# Patient Record
Sex: MALE | Race: BLACK OR AFRICAN AMERICAN | Employment: FULL TIME | URBAN - METROPOLITAN AREA
[De-identification: names, ages, dates, MRNs, and addresses within clinical notes are randomized per-mention and may not be internally consistent; named-entity substitution may affect disease eponyms.]

---

## 2017-10-31 ENCOUNTER — HOSPITAL ENCOUNTER (OUTPATIENT)
Dept: ULTRASOUND IMAGING | Age: 57
Discharge: HOME | End: 2017-10-31
Attending: INTERNAL MEDICINE
Payer: COMMERCIAL

## 2017-10-31 DIAGNOSIS — Z94.0 KIDNEY TRANSPLANTED: ICD-10-CM

## 2017-10-31 PROCEDURE — 76776 US EXAM K TRANSPL W/DOPPLER: CPT

## 2018-01-16 ENCOUNTER — APPOINTMENT (OUTPATIENT)
Dept: CT IMAGING | Age: 58
DRG: 683 | End: 2018-01-16
Attending: EMERGENCY MEDICINE
Payer: COMMERCIAL

## 2018-01-16 ENCOUNTER — APPOINTMENT (OUTPATIENT)
Dept: ULTRASOUND IMAGING | Age: 58
DRG: 683 | End: 2018-01-16
Attending: INTERNAL MEDICINE
Payer: COMMERCIAL

## 2018-01-16 ENCOUNTER — HOSPITAL ENCOUNTER (INPATIENT)
Age: 58
LOS: 2 days | Discharge: HOME | DRG: 683 | End: 2018-01-20
Attending: EMERGENCY MEDICINE | Admitting: INTERNAL MEDICINE
Payer: COMMERCIAL

## 2018-01-16 DIAGNOSIS — N17.9 ACUTE KIDNEY INJURY (HCC): Primary | ICD-10-CM

## 2018-01-16 DIAGNOSIS — Z94.0 RENAL TRANSPLANT RECIPIENT: ICD-10-CM

## 2018-01-16 PROBLEM — N18.6 ESRD (END STAGE RENAL DISEASE) (HCC): Status: ACTIVE | Noted: 2018-01-16

## 2018-01-16 PROBLEM — N18.9 ACUTE ON CHRONIC KIDNEY FAILURE (HCC): Status: ACTIVE | Noted: 2018-01-16

## 2018-01-16 PROBLEM — I10 HTN (HYPERTENSION): Status: ACTIVE | Noted: 2018-01-16

## 2018-01-16 LAB
ALBUMIN SERPL-MCNC: 4.7 G/DL (ref 3.5–5)
ALBUMIN/GLOB SERPL: 1 {RATIO} (ref 1.2–3.5)
ALP SERPL-CCNC: 52 U/L (ref 50–136)
ALT SERPL-CCNC: 28 U/L (ref 12–65)
ANION GAP SERPL CALC-SCNC: 13 MMOL/L (ref 7–16)
AST SERPL-CCNC: 37 U/L (ref 15–37)
BASOPHILS # BLD: 0 K/UL (ref 0–0.2)
BASOPHILS NFR BLD: 0 % (ref 0–2)
BILIRUB SERPL-MCNC: 0.7 MG/DL (ref 0.2–1.1)
BUN SERPL-MCNC: 34 MG/DL (ref 6–23)
CALCIUM SERPL-MCNC: 9.9 MG/DL (ref 8.3–10.4)
CHLORIDE SERPL-SCNC: 101 MMOL/L (ref 98–107)
CO2 SERPL-SCNC: 25 MMOL/L (ref 21–32)
CREAT SERPL-MCNC: 3.55 MG/DL (ref 0.8–1.5)
DIFFERENTIAL METHOD BLD: ABNORMAL
EOSINOPHIL # BLD: 0.1 K/UL (ref 0–0.8)
EOSINOPHIL NFR BLD: 1 % (ref 0.5–7.8)
ERYTHROCYTE [DISTWIDTH] IN BLOOD BY AUTOMATED COUNT: 13.6 % (ref 11.9–14.6)
GLOBULIN SER CALC-MCNC: 4.9 G/DL (ref 2.3–3.5)
GLUCOSE SERPL-MCNC: 150 MG/DL (ref 65–100)
HCT VFR BLD AUTO: 49.2 % (ref 41.1–50.3)
HGB BLD-MCNC: 16.9 G/DL (ref 13.6–17.2)
IMM GRANULOCYTES # BLD: 0 K/UL (ref 0–0.5)
IMM GRANULOCYTES NFR BLD AUTO: 0 % (ref 0–5)
LYMPHOCYTES # BLD: 0.6 K/UL (ref 0.5–4.6)
LYMPHOCYTES NFR BLD: 8 % (ref 13–44)
MCH RBC QN AUTO: 31.6 PG (ref 26.1–32.9)
MCHC RBC AUTO-ENTMCNC: 34.3 G/DL (ref 31.4–35)
MCV RBC AUTO: 92.1 FL (ref 79.6–97.8)
MONOCYTES # BLD: 0.6 K/UL (ref 0.1–1.3)
MONOCYTES NFR BLD: 8 % (ref 4–12)
NEUTS SEG # BLD: 6.4 K/UL (ref 1.7–8.2)
NEUTS SEG NFR BLD: 83 % (ref 43–78)
PLATELET # BLD AUTO: 175 K/UL (ref 150–450)
PMV BLD AUTO: 12.2 FL (ref 10.8–14.1)
POTASSIUM SERPL-SCNC: 4.6 MMOL/L (ref 3.5–5.1)
PROT SERPL-MCNC: 9.6 G/DL (ref 6.3–8.2)
RBC # BLD AUTO: 5.34 M/UL (ref 4.23–5.67)
SODIUM SERPL-SCNC: 139 MMOL/L (ref 136–145)
WBC # BLD AUTO: 7.8 K/UL (ref 4.3–11.1)

## 2018-01-16 PROCEDURE — C9113 INJ PANTOPRAZOLE SODIUM, VIA: HCPCS | Performed by: INTERNAL MEDICINE

## 2018-01-16 PROCEDURE — 87493 C DIFF AMPLIFIED PROBE: CPT | Performed by: INTERNAL MEDICINE

## 2018-01-16 PROCEDURE — 74011250636 HC RX REV CODE- 250/636: Performed by: INTERNAL MEDICINE

## 2018-01-16 PROCEDURE — 99284 EMERGENCY DEPT VISIT MOD MDM: CPT | Performed by: EMERGENCY MEDICINE

## 2018-01-16 PROCEDURE — 80053 COMPREHEN METABOLIC PANEL: CPT | Performed by: EMERGENCY MEDICINE

## 2018-01-16 PROCEDURE — 74011000250 HC RX REV CODE- 250: Performed by: INTERNAL MEDICINE

## 2018-01-16 PROCEDURE — 74011636320 HC RX REV CODE- 636/320: Performed by: EMERGENCY MEDICINE

## 2018-01-16 PROCEDURE — 96374 THER/PROPH/DIAG INJ IV PUSH: CPT | Performed by: EMERGENCY MEDICINE

## 2018-01-16 PROCEDURE — 87804 INFLUENZA ASSAY W/OPTIC: CPT | Performed by: INTERNAL MEDICINE

## 2018-01-16 PROCEDURE — 99218 HC RM OBSERVATION: CPT

## 2018-01-16 PROCEDURE — 0T9B70Z DRAINAGE OF BLADDER WITH DRAINAGE DEVICE, VIA NATURAL OR ARTIFICIAL OPENING: ICD-10-PCS | Performed by: INTERNAL MEDICINE

## 2018-01-16 PROCEDURE — 81003 URINALYSIS AUTO W/O SCOPE: CPT | Performed by: EMERGENCY MEDICINE

## 2018-01-16 PROCEDURE — 74011250636 HC RX REV CODE- 250/636: Performed by: EMERGENCY MEDICINE

## 2018-01-16 PROCEDURE — 85025 COMPLETE CBC W/AUTO DIFF WBC: CPT | Performed by: EMERGENCY MEDICINE

## 2018-01-16 PROCEDURE — 76776 US EXAM K TRANSPL W/DOPPLER: CPT

## 2018-01-16 PROCEDURE — 96361 HYDRATE IV INFUSION ADD-ON: CPT | Performed by: EMERGENCY MEDICINE

## 2018-01-16 PROCEDURE — 74176 CT ABD & PELVIS W/O CONTRAST: CPT

## 2018-01-16 RX ORDER — TAMSULOSIN HYDROCHLORIDE 0.4 MG/1
0.4 CAPSULE ORAL DAILY
Status: DISCONTINUED | OUTPATIENT
Start: 2018-01-17 | End: 2018-01-20 | Stop reason: HOSPADM

## 2018-01-16 RX ORDER — PREDNISONE 5 MG/1
5 TABLET ORAL
Status: DISCONTINUED | OUTPATIENT
Start: 2018-01-17 | End: 2018-01-20 | Stop reason: HOSPADM

## 2018-01-16 RX ORDER — ONDANSETRON 2 MG/ML
4 INJECTION INTRAMUSCULAR; INTRAVENOUS
Status: DISCONTINUED | OUTPATIENT
Start: 2018-01-16 | End: 2018-01-20 | Stop reason: HOSPADM

## 2018-01-16 RX ORDER — ONDANSETRON 2 MG/ML
4 INJECTION INTRAMUSCULAR; INTRAVENOUS
Status: COMPLETED | OUTPATIENT
Start: 2018-01-16 | End: 2018-01-16

## 2018-01-16 RX ORDER — SODIUM CHLORIDE 0.9 % (FLUSH) 0.9 %
5-10 SYRINGE (ML) INJECTION EVERY 8 HOURS
Status: DISCONTINUED | OUTPATIENT
Start: 2018-01-16 | End: 2018-01-20 | Stop reason: HOSPADM

## 2018-01-16 RX ORDER — MYCOPHENOLATE MOFETIL 500 MG/1
500 TABLET ORAL 2 TIMES DAILY
Status: DISCONTINUED | OUTPATIENT
Start: 2018-01-16 | End: 2018-01-17

## 2018-01-16 RX ORDER — TACROLIMUS 1 MG/1
6 CAPSULE ORAL EVERY 12 HOURS
Status: DISCONTINUED | OUTPATIENT
Start: 2018-01-16 | End: 2018-01-20 | Stop reason: HOSPADM

## 2018-01-16 RX ORDER — SIROLIMUS 2 MG/1
8 TABLET, FILM COATED ORAL DAILY
Status: DISCONTINUED | OUTPATIENT
Start: 2018-01-17 | End: 2018-01-16

## 2018-01-16 RX ORDER — PANTOPRAZOLE SODIUM 40 MG/1
40 TABLET, DELAYED RELEASE ORAL
Status: DISCONTINUED | OUTPATIENT
Start: 2018-01-17 | End: 2018-01-20 | Stop reason: HOSPADM

## 2018-01-16 RX ORDER — HEPARIN SODIUM 5000 [USP'U]/ML
5000 INJECTION, SOLUTION INTRAVENOUS; SUBCUTANEOUS EVERY 8 HOURS
Status: DISCONTINUED | OUTPATIENT
Start: 2018-01-16 | End: 2018-01-20 | Stop reason: HOSPADM

## 2018-01-16 RX ORDER — SODIUM CHLORIDE 9 MG/ML
1000 INJECTION, SOLUTION INTRAVENOUS ONCE
Status: COMPLETED | OUTPATIENT
Start: 2018-01-16 | End: 2018-01-16

## 2018-01-16 RX ORDER — SODIUM CHLORIDE 0.9 % (FLUSH) 0.9 %
5-10 SYRINGE (ML) INJECTION AS NEEDED
Status: DISCONTINUED | OUTPATIENT
Start: 2018-01-16 | End: 2018-01-20 | Stop reason: HOSPADM

## 2018-01-16 RX ORDER — SODIUM CHLORIDE 9 MG/ML
100 INJECTION, SOLUTION INTRAVENOUS CONTINUOUS
Status: DISCONTINUED | OUTPATIENT
Start: 2018-01-16 | End: 2018-01-19

## 2018-01-16 RX ORDER — ACETAMINOPHEN 325 MG/1
650 TABLET ORAL
Status: DISCONTINUED | OUTPATIENT
Start: 2018-01-16 | End: 2018-01-20 | Stop reason: HOSPADM

## 2018-01-16 RX ADMIN — ONDANSETRON 4 MG: 2 INJECTION INTRAMUSCULAR; INTRAVENOUS at 10:01

## 2018-01-16 RX ADMIN — SODIUM CHLORIDE 40 MG: 9 INJECTION INTRAMUSCULAR; INTRAVENOUS; SUBCUTANEOUS at 21:17

## 2018-01-16 RX ADMIN — HEPARIN SODIUM 5000 UNITS: 5000 INJECTION, SOLUTION INTRAVENOUS; SUBCUTANEOUS at 21:17

## 2018-01-16 RX ADMIN — SODIUM CHLORIDE 100 ML/HR: 900 INJECTION, SOLUTION INTRAVENOUS at 20:00

## 2018-01-16 RX ADMIN — SODIUM CHLORIDE 1000 ML: 900 INJECTION, SOLUTION INTRAVENOUS at 10:01

## 2018-01-16 RX ADMIN — DIATRIZOATE MEGLUMINE AND DIATRIZOATE SODIUM 15 ML: 600; 100 SOLUTION ORAL; RECTAL at 10:02

## 2018-01-16 RX ADMIN — MYCOPHENOLATE MOFETIL 500 MG: 500 TABLET ORAL at 21:17

## 2018-01-16 RX ADMIN — TACROLIMUS 6 MG: 1 CAPSULE ORAL at 21:00

## 2018-01-16 RX ADMIN — ONDANSETRON 4 MG: 2 INJECTION INTRAMUSCULAR; INTRAVENOUS at 21:17

## 2018-01-16 RX ADMIN — Medication 10 ML: at 21:26

## 2018-01-16 NOTE — H&P
Hospitalist H&P/Consult Note     Admit Date:  2018  9:05 AM   Name:  Lela Root   Age:  65 y.o.  :  1960   MRN:  [id number]   PCP:  Laura Nieto MD  Treatment Team: Attending Provider: Ivy Harp MD; Primary Nurse: Marie Mckeon RN    HPI:     65years old M with PMH of ESRD s/p kidney transplant, HTN presented to the ED complaining of nausea, vomiting and diarrhea since last night. Patient stated last night he was having \" bowels cramps\" associated with nausea and vomiting several times food content. Patient described the diarrhea as watery several episodes. Patient stated he is working at a [employer] where there are several people with similar symptoms. Patient also reported having associated reflux symptoms. Patient denies sick contact at home or eating outside/ new food intake. Patient also denies SOB, chest pain, cough or urinary symptoms. In the ED work up revealed acute on chronic kidney injury. 10 systems reviewed and negative except as noted in HPI. Past Medical History:   Diagnosis Date    Chronic Kidney Disease     renal transplant    GERD (Gastroesophageal Reflux Disease)     Hypertension     PUD (Peptic Ulcer Disease)     Thromboembolus ([address]       Past Surgical History:   Procedure Laterality Date    HX TRANSPLANT      kidney      Prior to Admission Medications   Prescriptions Last Dose Informant Patient Reported? Taking? ALENDRONATE SODIUM (FOSAMAX PO)  Other Yes No   Sig: Take 1 Tab by mouth daily. amlodipine 10 mg Tab 1 Tab, benazepril 10 mg Tab 4 Tab   Yes No   Sig: Take 1 Dose by mouth daily. calcium & magnesium carbonates 311-232 mg per tablet   Yes No   Sig: Take 1 Tab by mouth daily. esomeprazole (NEXIUM) 40 mg capsule   Yes No   Sig: Take 1 Cap by mouth daily. ezetimibe-simvastatin (VYTORIN 10/20) 10-20 mg per tablet   Yes No   Sig: Take 1 Tab by mouth nightly.    furosemide (LASIX) 80 mg tablet   Yes No   Sig: Take 1 Tab by mouth daily. hydrocodone-acetaminophen (LORTAB) 7.5-500 mg per tablet   Yes No   Sig: Take 1 Tab by mouth every four (4) hours as needed for Pain.   mycophenolate (CELLCEPT) 500 mg tablet   Yes No   Sig: Take 500 mg by mouth two (2) times a day. oxybutynin (DITROPAN) 5 mg tablet   Yes No   Sig: Take 5 mg by mouth daily. predniSONE (DELTASONE) 5 mg tablet   Yes No   Sig: Take 1 Tab by mouth daily (with breakfast). sirolimus (RAPAMUNE) 2 mg Tab   Yes No   Sig: Take 4 Tabs by mouth daily. sodium & potassium bicarbonate 360-180 mg Tab   Yes No   Sig: Take 2 Tabs by mouth two (2) times a day. tamsulosin (FLOMAX) 0.4 mg capsule   Yes No   Sig: Take 0.4 mg by mouth daily. Facility-Administered Medications: None     No Known Allergies   Social History   Substance Use Topics    Smoking status: Never Smoker    Smokeless tobacco: Not on file    Alcohol use No      Family History   Problem Relation Age of Onset    Alcohol abuse Neg Hx     Arthritis-rheumatoid Neg Hx     Asthma Neg Hx     Bleeding Prob Neg Hx     Cancer Neg Hx     Diabetes Neg Hx     Elevated Lipids Neg Hx     Headache Neg Hx     Heart Disease Neg Hx     Hypertension Neg Hx     Lung Disease Neg Hx     Migraines Neg Hx     Psychiatric Disorder Neg Hx     Stroke Neg Hx     Mental Retardation Neg Hx         There is no immunization history on file for this patient. Objective:     Patient Vitals for the past 24 hrs:   Temp Pulse Resp BP SpO2   01/16/18 0710 97.9 °F (36.6 °C) 95 16 128/82 94 %     Oxygen Therapy  O2 Sat (%): 94 % (01/16/18 0710)  O2 Device: Room air (01/16/18 0710)  No intake or output data in the 24 hours ending 01/16/18 1529    Physical Exam:  General:    Well nourished. Alert. Eyes:   Normal sclera. Extraocular movements intact. ENT:  Normocephalic, atraumatic. Dry mucous membranes  CV:   RRR. No murmur, rub, or gallop. Lungs:  CTAB. No wheezing, rhonchi, or rales.   Abdomen: Soft, nontender, nondistended. Bowel sounds normal.   Extremities: Warm and dry. No cyanosis or edema. Neurologic: CN II-XII grossly intact. Sensation intact. Skin:     No rashes or jaundice. No wounds. Psych:  Normal mood and affect. I reviewed the labs, imaging, EKGs, telemetry, and other studies done this admission. Data Review:   Recent Results (from the past 24 hour(s))   CBC WITH AUTOMATED DIFF    Collection Time: 01/16/18 10:07 AM   Result Value Ref Range    WBC 7.8 4.3 - 11.1 K/uL    RBC 5.34 4.23 - 5.67 M/uL    HGB 16.9 13.6 - 17.2 g/dL    HCT 49.2 41.1 - 50.3 %    MCV 92.1 79.6 - 97.8 FL    MCH 31.6 26.1 - 32.9 PG    MCHC 34.3 31.4 - 35.0 g/dL    RDW 13.6 11.9 - 14.6 %    PLATELET 421 906 - 452 K/uL    MPV 12.2 10.8 - 14.1 FL    DF AUTOMATED      NEUTROPHILS 83 (H) 43 - 78 %    LYMPHOCYTES 8 (L) 13 - 44 %    MONOCYTES 8 4.0 - 12.0 %    EOSINOPHILS 1 0.5 - 7.8 %    BASOPHILS 0 0.0 - 2.0 %    IMMATURE GRANULOCYTES 0 0.0 - 5.0 %    ABS. NEUTROPHILS 6.4 1.7 - 8.2 K/UL    ABS. LYMPHOCYTES 0.6 0.5 - 4.6 K/UL    ABS. MONOCYTES 0.6 0.1 - 1.3 K/UL    ABS. EOSINOPHILS 0.1 0.0 - 0.8 K/UL    ABS. BASOPHILS 0.0 0.0 - 0.2 K/UL    ABS. IMM. GRANS. 0.0 0.0 - 0.5 K/UL   METABOLIC PANEL, COMPREHENSIVE    Collection Time: 01/16/18 10:07 AM   Result Value Ref Range    Sodium 139 136 - 145 mmol/L    Potassium 4.6 3.5 - 5.1 mmol/L    Chloride 101 98 - 107 mmol/L    CO2 25 21 - 32 mmol/L    Anion gap 13 7 - 16 mmol/L    Glucose 150 (H) 65 - 100 mg/dL    BUN 34 (H) 6 - 23 MG/DL    Creatinine 3.55 (H) 0.8 - 1.5 MG/DL    GFR est AA 23 (L) >60 ml/min/1.73m2    GFR est non-AA 19 (L) >60 ml/min/1.73m2    Calcium 9.9 8.3 - 10.4 MG/DL    Bilirubin, total 0.7 0.2 - 1.1 MG/DL    ALT (SGPT) 28 12 - 65 U/L    AST (SGOT) 37 15 - 37 U/L    Alk.  phosphatase 52 50 - 136 U/L    Protein, total 9.6 (H) 6.3 - 8.2 g/dL    Albumin 4.7 3.5 - 5.0 g/dL    Globulin 4.9 (H) 2.3 - 3.5 g/dL    A-G Ratio 1.0 (L) 1.2 - 3.5         Imaging Studies:  CXR Results (Last 48 hours)    None        CT Results  (Last 48 hours)               01/16/18 1225  CT ABD PELV WO CONT Final result    Impression:  IMPRESSION: Status post post bilateral nephrectomy, nonobstructive calculi   within the left lower quadrant renal transplant, possible mild small bowel   ileus. Narrative:  ABDOMINAL CT without CONTRAST: 1/16/2018   Indication: Nausea vomiting diarrhea x24 hours, history renal transplant   Comparison: None   Scratch at Automated exposure control, adjustment of the mAs and/or kVp   according to patient size, standardized low-dose protocol, and/or iterative   reconstruction technique. .       Findings: The lung bases are clear. The heart is enlarged. The unenhanced liver, collapsed   gallbladder, adrenal glands, pancreas, spleen, and aorta is unremarkable. Patient status post bilateral nephrectomy and appendectomy and there is a left   lower quadrant renal transplant. There are a few less than 3 mm nonobstructive   calculi within the transplant kidney. There is a mild fluid distention of the   small bowel. There is no significant adenopathy. CT scan of pelvis without contrast:       The prostate, seminal vesicles, bladder, soft tissues, and the bony structures   are unremarkable.                  Assessment and Plan:     Hospital Problems as of 1/16/2018  Never Reviewed          Codes Class Noted - Resolved POA    * (Principal)Acute on chronic kidney failure ([facility] ICD-10-CM: N17.9, N18.9  ICD-9-CM: 584.9, 585.9  1/16/2018 - Present Unknown        HTN (hypertension) ICD-10-CM: I10  ICD-9-CM: 401.9  1/16/2018 - Present Unknown        ESRD (end stage renal disease) ([facility] ICD-10-CM: N18.6  ICD-9-CM: 585.6  1/16/2018 - Present Unknown            A/P:    -Acute on chronic kidney injury  Likely secondary to dehydration  Baseline cr 1.4 on 05/2017, today Cr is above 3  S/p 1L of fluid at ED    Plan  Send urine electrolytes and get US abdomen  Start gentle hydration with IVFs NS  Nephrology eval  Restart prednisone, will await for medication reconciliation to restart 2 more of his medications. Strict I&O    -Nausea/vomiting/ diarrhea  Could be viral infection]  Hydration as above  Check c.difficile and influenza  PPI trial    -HTN  controlled      · DVT ppx :heparin  · Code: full  Case discussed with patient and wife. Estimated LOS:  1-2 nights    Signed:   Christel Aceves MD

## 2018-01-16 NOTE — ED NOTES
TRANSFER - OUT REPORT:    Verbal report given to Summer Villarreal RN(name) on [employer]  being transferred to [facility](unit) for routine progression of care       Report consisted of patients Situation, Background, Assessment and   Recommendations(SBAR). Information from the following report(s) SBAR, ED Summary and MAR was reviewed with the receiving nurse. Lines:   Peripheral IV 01/16/18 Right Hand (Active)   Site Assessment Clean, dry, & intact 1/16/2018 10:08 AM   Phlebitis Assessment 0 1/16/2018 10:08 AM   Infiltration Assessment 0 1/16/2018 10:08 AM   Dressing Status Clean, dry, & intact 1/16/2018 10:08 AM   Hub Color/Line Status Green 1/16/2018 10:08 AM        Opportunity for questions and clarification was provided.       Patient transported with:   [employer]

## 2018-01-16 NOTE — PROGRESS NOTES
TRANSFER - IN REPORT:    Verbal report received from Callie Varma on [employer]  being received from ED for routine progression of care      Report consisted of patients Situation, Background, Assessment and   Recommendations(SBAR). Information from the following report(s) SBAR and ED Summary was reviewed with the receiving nurse. Opportunity for questions and clarification was provided. Assessment completed upon patients arrival to unit and care assumed.

## 2018-01-16 NOTE — IP AVS SNAPSHOT
[facility] 
 
 
 [address] 
[phone] Patient: Joel Carlson MRN: [id number] About your hospitalization You were admitted on:  January 16, 2018 You last received care in the:  [facility] 6 MED SURG You were discharged on:  January 20, 2018 Why you were hospitalized Your primary diagnosis was:  Acute On Chronic Kidney Failure (Hcc) Your diagnoses also included:  Htn (Hypertension), Esrd (End Stage Renal Disease) (Hcc) Follow-up Information Follow up With Details Comments Contact Info Sharyn Garg MD   [address] [facility] Nephrology [facility] [address] 
[phone] Olga Ortega MD In 5 days  [place] [address] [phone] Discharge Orders None A check damien indicates which time of day the medication should be taken. My Medications START taking these medications Instructions Each Dose to Equal  
 Morning Noon Evening Bedtime  
 amLODIPine 5 mg tablet Commonly known as:  Claudell Olivia Your last dose was: Your next dose is: Take 1 Tab by mouth daily. 5 mg CONTINUE taking these medications Instructions Each Dose to Equal  
 Morning Noon Evening Bedtime  
 calcium and magnesium carbonat 311-232 mg per tablet Your last dose was: Your next dose is: Take 1 Tab by mouth daily. 1 Tab CELLCEPT 500 mg tablet Generic drug:  mycophenolate Your last dose was: Your next dose is: Take 1,500 mg by mouth two (2) times a day. 1500 mg  
    
   
   
   
  
 CRESTOR 10 mg tablet Generic drug:  rosuvastatin Your last dose was: Your next dose is: Take 10 mg by mouth nightly. 10 mg  
    
   
   
   
  
 ferrous sulfate 324 mg (65 mg iron) tablet Your last dose was: Your next dose is: Take  by mouth Daily (before breakfast). FLOMAX 0.4 mg capsule Generic drug:  tamsulosin Your last dose was: Your next dose is: Take 0.4 mg by mouth daily. 0.4 mg  
    
   
   
   
  
 FOSAMAX PO Your last dose was: Your next dose is: Take 1 Tab by mouth daily. 1 Tab  
    
   
   
   
  
 gabapentin 600 mg tablet Commonly known as:  NEURONTIN Your last dose was: Your next dose is: Take 600 mg by mouth daily. 600 mg HORIZANT 600 mg Tber Generic drug:  gabapentin enacarbil Your last dose was: Your next dose is: Take 300 mg by mouth daily. 300 mg HYDROcodone-acetaminophen 7.5-500 mg per tablet Commonly known as:  Melva Fat Your last dose was: Your next dose is: Take 1 Tab by mouth every four (4) hours as needed for Pain. 1 Tab LACLOTION EX Your last dose was: Your next dose is:    
   
   
 by Apply Externally route as needed (lotion). NexIUM 40 mg capsule Generic drug:  esomeprazole Your last dose was: Your next dose is: Take 1 Cap by mouth daily. 1 Cap  
    
   
   
   
  
 predniSONE 5 mg tablet Commonly known as:  Julian Bautista Your last dose was: Your next dose is: Take 1 Tab by mouth daily (with breakfast). 1 Tab * PROGRAF 5 mg capsule Generic drug:  tacrolimus Your last dose was: Your next dose is: Take 5 mg by mouth daily. Total 6 mg prograf  
 5 mg * PROGRAF 1 mg capsule Generic drug:  tacrolimus Your last dose was: Your next dose is: Take 1 mg by mouth daily. 1 mg sodium, potassium bicarbonate 360-180 mg Tab Your last dose was: Your next dose is: Take 2 Tabs by mouth two (2) times a day. 2 Tab ULTRAM 50 mg tablet Generic drug:  traMADol Your last dose was: Your next dose is: Take 50 mg by mouth two (2) times a day. 50 mg  
    
   
   
   
  
 VITAMIN D3 2,000 unit Tab Generic drug:  cholecalciferol (vitamin D3) Your last dose was: Your next dose is: Take 2,000 Units by mouth daily. 2000 Units * Notice: This list has 2 medication(s) that are the same as other medications prescribed for you. Read the directions carefully, and ask your doctor or other care provider to review them with you. STOP taking these medications   
 lisinopril 10 mg tablet Commonly known as:  Carletta Cockayne Where to Get Your Medications Information on where to get these meds will be given to you by the nurse or doctor. ! Ask your nurse or doctor about these medications  
  amLODIPine 5 mg tablet Discharge Instructions DISCHARGE SUMMARY from Nurse PATIENT INSTRUCTIONS: 
 
 
F-face looks uneven A-arms unable to move or move unevenly S-speech slurred or non-existent T-time-call 911 as soon as signs and symptoms begin-DO NOT go Back to bed or wait to see if you get better-TIME IS BRAIN. Warning Signs of HEART ATTACK Call 911 if you have these symptoms: 
? Chest discomfort. Most heart attacks involve discomfort in the center of the chest that lasts more than a few minutes, or that goes away and comes back. It can feel like uncomfortable pressure, squeezing, fullness, or pain. ? Discomfort in other areas of the upper body. Symptoms can include pain or discomfort in one or both arms, the back, neck, jaw, or stomach. ? Shortness of breath with or without chest discomfort. ? Other signs may include breaking out in a cold sweat, nausea, or lightheadedness. Don't wait more than five minutes to call [address]! Fast action can save your life. Calling 911 is almost always the fastest way to get lifesaving treatment. Emergency Medical Services staff can begin treatment when they arrive  up to an hour sooner than if someone gets to the hospital by car. The discharge information has been reviewed with the patient. The patient verbalized understanding. Discharge medications reviewed with the patient and appropriate educational materials and side effects teaching were provided. ___________________________________________________________________________________________________________________________________ [email or web address] Announcement We are excited to announce that we are making your provider's discharge notes available to you in [employer]. You will see these notes when they are completed and signed by the physician that discharged you from your recent hospital stay. If you have any questions or concerns about any information you see in [employer], please call the Health Information Department where you were seen or reach out to your Primary Care Provider for more information about your plan of care. Introducing [facility]! Carmelita Fothergill introduces [employer] patient portal. Now you can access parts of your medical record, email your doctor's office, and request medication refills online. 1. In your internet browser, go to [email or web address] 2. Click on the First Time User? Click Here link in the Sign In box. You will see the New Member Sign Up page. 3. Enter your [employer] Access Code exactly as it appears below.  You will not need to use this code after youve completed the sign-up process. If you do not sign up before the expiration date, you must request a new code. · [employer] Access Code: [email or web address] Expires: 1/25/2018  3:01 PM 
 
4. Enter the last four digits of your Social Security Number (xxxx) and Date of Birth (mm/dd/yyyy) as indicated and click Submit. You will be taken to the next sign-up page. 5. Create a [employer] ID. This will be your [employer] login ID and cannot be changed, so think of one that is secure and easy to remember. 6. Create a [employer] password. You can change your password at any time. 7. Enter your Password Reset Question and Answer. This can be used at a later time if you forget your password. 8. Enter your e-mail address. You will receive e-mail notification when new information is available in [address] 9. Click Sign Up. You can now view and download portions of your medical record. 10. Click the Download Summary menu link to download a portable copy of your medical information. If you have questions, please visit the Frequently Asked Questions section of the [employer] website. Remember, [employer] is NOT to be used for urgent needs. For medical emergencies, dial 911. Now available from your iPhone and Android! Providers Seen During Your Hospitalization Provider Specialty Primary office phone Donzella Hammans, MD Emergency Medicine [phone] Chavez Yi MD Internal Medicine [phone] Your Primary Care Physician (PCP) Primary Care Physician Office Phone Office Fax Thompson Patel [address] [phone] You are allergic to the following No active allergies Recent Documentation Weight BMI  
  
  
  
 122.9 kg 33.86 kg/m2 Emergency Contacts Name Discharge Info Relation Home Work Mobile Violetta Lorene VILLANUEVA   [phone] Patient Belongings The following personal items are in your possession at time of discharge: 
  Dental Appliances: None  Visual Aid: At bedside      Home Medications: None   Jewelry: Ring, Necklace  Clothing: Pants, Shirt, Footwear    Other Valuables: Cell Phone Please provide this summary of care documentation to your next provider. Signatures-by signing, you are acknowledging that this After Visit Summary has been reviewed with you and you have received a copy. Patient Signature:  ____________________________________________________________ Date:  ____________________________________________________________  
  
[facility] Provider Signature:  ____________________________________________________________ Date:  ____________________________________________________________

## 2018-01-16 NOTE — ED PROVIDER NOTES
HPI Comments: 60-year-old sstatus post renal transplant presents to the emergency department with nausea vomiting diarrhea. Started about 7:00 last night. This persisted throughout the night. No alleviating. No aggravating. Patient states he continues to have watery diarrhea all here in the emergency department    Diffuse abdominal pain  He feels hot to the touch but no documented fevers. He takes CellCept and Prograf    Patient is a 65 y.o. male presenting with diarrhea. Diarrhea    Associated symptoms include a fever, diarrhea, nausea and vomiting. Past Medical History:   Diagnosis Date    Chronic Kidney Disease     renal transplant    GERD (Gastroesophageal Reflux Disease)     Hypertension     PUD (Peptic Ulcer Disease)     [facility]ol[facility] ([facility]        Past Surgical History:   Procedure Laterality Date    HX TRANSPLANT      kidney         Family History:   Problem Relation Age of Onset    Alcohol abuse Neg Hx     Arthritis-rheumatoid Neg Hx     Asthma Neg Hx     Bleeding Prob Neg Hx     Cancer Neg Hx     Diabetes Neg Hx     Elevated Lipids Neg Hx     Headache Neg Hx     Heart Disease Neg Hx     Hypertension Neg Hx     Lung Disease Neg Hx     Migraines Neg Hx     Psychiatric Disorder Neg Hx     Stroke Neg Hx     Mental Retardation Neg Hx        Social History     Social History    Marital status:      Spouse name: N/A    Number of children: N/A    Years of education: N/A     Occupational History    Not on file. Social History Main Topics    Smoking status: Never Smoker    Smokeless tobacco: Not on file    Alcohol use No    Drug use: No    Sexual activity: Not on file     Other Topics Concern    Not on file     Social History Narrative    No narrative on file         ALLERGIES: Review of patient's allergies indicates no known allergies. Review of Systems   Constitutional: Positive for chills and fever. HENT: Negative.     Respiratory: Negative for shortness of breath. Gastrointestinal: Positive for abdominal pain, diarrhea, nausea and vomiting. Musculoskeletal: Negative. Skin: Negative. Psychiatric/Behavioral: Negative. Vitals:    01/16/18 0710   BP: 128/82   Pulse: 95   Resp: 16   Temp: 97.9 °F (36.6 °C)   SpO2: 94%            Physical Exam   Constitutional: He is oriented to person, place, and time. He appears well-developed and well-nourished. HENT:   Head: Normocephalic and atraumatic. Eyes: EOM are normal. Pupils are equal, round, and reactive to light. Cardiovascular: Normal rate and regular rhythm. Pulmonary/Chest: Breath sounds normal. No respiratory distress. He has no wheezes. Abdominal: Soft. He exhibits no distension. There is tenderness. There is guarding. There is no rebound. Musculoskeletal: Normal range of motion. Neurological: He is alert and oriented to person, place, and time. Skin: Skin is warm and dry. Psychiatric: He has a normal mood and affect. His behavior is normal.   Nursing note and vitals reviewed. MDM  Number of Diagnoses or Management Options  Diagnosis management comments: 50-year-old male renal transplant patient presents to the emergency Department nausea vomiting diarrhea    Appears uncomfortable    Hydrate. Given some Zofran. I think he needs a CT of the abdomen and pelvis. We'll not use IV contrast will give him oral contrast instead. Jorge L Ugarte MD; 1/16/2018 @9:30 AM===========================================      ED Course   patient resting. Has been up to the bathroom several times.   CT - negative for any acute process    Labs reviewed   Baseline Cr is 14-1.5 per result from [facility] and the patient  Cr today is 3.55    Pt with solitary transplant kidney -- needs obs for hydration and repeat labs    Consult the hospitalist  Jorge L Ugarte MD; 1/16/2018 @1:57 PM===========================================      Procedures

## 2018-01-17 LAB
ANION GAP SERPL CALC-SCNC: 12 MMOL/L (ref 7–16)
APPEARANCE UR: ABNORMAL
BACTERIA SPEC CULT: NEGATIVE
BACTERIA SPEC CULT: NORMAL
BACTERIA URNS QL MICRO: 0 /HPF
BILIRUB UR QL: ABNORMAL
BUN SERPL-MCNC: 45 MG/DL (ref 6–23)
CALCIUM SERPL-MCNC: 7.9 MG/DL (ref 8.3–10.4)
CASTS URNS QL MICRO: ABNORMAL /LPF
CHLORIDE SERPL-SCNC: 105 MMOL/L (ref 98–107)
CO2 SERPL-SCNC: 20 MMOL/L (ref 21–32)
COLOR UR: ABNORMAL
CREAT SERPL-MCNC: 3.02 MG/DL (ref 0.8–1.5)
CREAT UR-MCNC: 554 MG/DL
EOSINOPHIL #/AREA URNS HPF: POSITIVE /[HPF]
EPI CELLS #/AREA URNS HPF: ABNORMAL /HPF
FLUAV AG NPH QL IA: NEGATIVE
FLUBV AG NPH QL IA: NEGATIVE
GLUCOSE SERPL-MCNC: 113 MG/DL (ref 65–100)
GLUCOSE UR STRIP.AUTO-MCNC: NEGATIVE MG/DL
HGB UR QL STRIP: ABNORMAL
KETONES UR QL STRIP.AUTO: NEGATIVE MG/DL
LEUKOCYTE ESTERASE UR QL STRIP.AUTO: ABNORMAL
NITRITE UR QL STRIP.AUTO: NEGATIVE
PH UR STRIP: 5.5 [PH] (ref 5–9)
POTASSIUM SERPL-SCNC: 3.8 MMOL/L (ref 3.5–5.1)
PROT UR STRIP-MCNC: 30 MG/DL
RBC #/AREA URNS HPF: ABNORMAL /HPF
SERVICE CMNT-IMP: NORMAL
SODIUM SERPL-SCNC: 137 MMOL/L (ref 136–145)
SODIUM UR-SCNC: 25 MMOL/L
SP GR UR REFRACTOMETRY: 1.02 (ref 1–1.02)
UROBILINOGEN UR QL STRIP.AUTO: 0.2 EU/DL (ref 0.2–1)
WBC URNS QL MICRO: ABNORMAL /HPF

## 2018-01-17 PROCEDURE — 74011250636 HC RX REV CODE- 250/636: Performed by: INTERNAL MEDICINE

## 2018-01-17 PROCEDURE — 80048 BASIC METABOLIC PNL TOTAL CA: CPT | Performed by: INTERNAL MEDICINE

## 2018-01-17 PROCEDURE — 84300 ASSAY OF URINE SODIUM: CPT | Performed by: INTERNAL MEDICINE

## 2018-01-17 PROCEDURE — 74011250637 HC RX REV CODE- 250/637: Performed by: INTERNAL MEDICINE

## 2018-01-17 PROCEDURE — 74011636637 HC RX REV CODE- 636/637: Performed by: INTERNAL MEDICINE

## 2018-01-17 PROCEDURE — 36415 COLL VENOUS BLD VENIPUNCTURE: CPT | Performed by: INTERNAL MEDICINE

## 2018-01-17 PROCEDURE — 81001 URINALYSIS AUTO W/SCOPE: CPT | Performed by: INTERNAL MEDICINE

## 2018-01-17 PROCEDURE — 82570 ASSAY OF URINE CREATININE: CPT | Performed by: INTERNAL MEDICINE

## 2018-01-17 PROCEDURE — 87205 SMEAR GRAM STAIN: CPT | Performed by: INTERNAL MEDICINE

## 2018-01-17 PROCEDURE — 99218 HC RM OBSERVATION: CPT

## 2018-01-17 PROCEDURE — 74011250636 HC RX REV CODE- 250/636: Performed by: HOSPITALIST

## 2018-01-17 RX ORDER — MYCOPHENOLATE MOFETIL 500 MG/1
1500 TABLET ORAL 2 TIMES DAILY
Status: DISCONTINUED | OUTPATIENT
Start: 2018-01-18 | End: 2018-01-17

## 2018-01-17 RX ORDER — MYCOPHENOLATE MOFETIL 500 MG/1
500 TABLET ORAL 3 TIMES DAILY
Status: DISCONTINUED | OUTPATIENT
Start: 2018-01-18 | End: 2018-01-20 | Stop reason: HOSPADM

## 2018-01-17 RX ORDER — MYCOPHENOLATE MOFETIL 500 MG/1
500 TABLET ORAL ONCE
Status: COMPLETED | OUTPATIENT
Start: 2018-01-17 | End: 2018-01-17

## 2018-01-17 RX ADMIN — Medication 10 ML: at 21:42

## 2018-01-17 RX ADMIN — TACROLIMUS 6 MG: 1 CAPSULE ORAL at 09:01

## 2018-01-17 RX ADMIN — TACROLIMUS 6 MG: 1 CAPSULE ORAL at 21:07

## 2018-01-17 RX ADMIN — TAMSULOSIN HYDROCHLORIDE 0.4 MG: 0.4 CAPSULE ORAL at 09:01

## 2018-01-17 RX ADMIN — MYCOPHENOLATE MOFETIL 500 MG: 500 TABLET ORAL at 23:18

## 2018-01-17 RX ADMIN — HEPARIN SODIUM 5000 UNITS: 5000 INJECTION, SOLUTION INTRAVENOUS; SUBCUTANEOUS at 21:08

## 2018-01-17 RX ADMIN — HEPARIN SODIUM 5000 UNITS: 5000 INJECTION, SOLUTION INTRAVENOUS; SUBCUTANEOUS at 04:00

## 2018-01-17 RX ADMIN — PREDNISONE 5 MG: 5 TABLET ORAL at 09:01

## 2018-01-17 RX ADMIN — MYCOPHENOLATE MOFETIL 500 MG: 500 TABLET ORAL at 17:35

## 2018-01-17 RX ADMIN — Medication 10 ML: at 05:44

## 2018-01-17 RX ADMIN — Medication 10 ML: at 13:12

## 2018-01-17 RX ADMIN — MYCOPHENOLATE MOFETIL 500 MG: 500 TABLET ORAL at 09:01

## 2018-01-17 RX ADMIN — HEPARIN SODIUM 5000 UNITS: 5000 INJECTION, SOLUTION INTRAVENOUS; SUBCUTANEOUS at 11:56

## 2018-01-17 RX ADMIN — PANTOPRAZOLE SODIUM 40 MG: 40 TABLET, DELAYED RELEASE ORAL at 05:36

## 2018-01-17 NOTE — PROGRESS NOTES
Initial visit to assess pt's spiritual concerns/needs. Pt shared his spiritual journey since his conversion experience. Offered prayer & affirmed pt's laura in God's care.

## 2018-01-17 NOTE — PROGRESS NOTES
Hospitalist Progress Note    2018  Admit Date: 2018  9:05 AM   NAME: Gabi Clarke   :  1960   DOS:              18  MRN:  [id number]   Attending: Megan Davis MD  PCP:  Karthikeyan Lombardo MD  Treatment Team: Attending Provider: Megan Davis MD; Consulting Provider: Delmar Harp MD; Utilization Review: Dominick Smallwood RN    Full Code     SUBJECTIVE:   As previously documented: 65years old M with PMH of ESRD s/p kidney transplant, HTN presented to the ED complaining of nausea, vomiting and diarrhea. Patient admitted on the  for acute on chronic kidney injury and gastroenteritis. Renal function improving with IVFs and gastroenteritis resolving. Nephrology eval appreciated       18    Gabi Clarke stated vomiting resolved but still having watery diarrhea. Patient denies cough, fever, SOB or abdominal pain. 10+ ROS reviewed and negative except for positive in HPI. No Known Allergies  Current Facility-Administered Medications   Medication Dose Route Frequency    sodium chloride (NS) flush 5-10 mL  5-10 mL IntraVENous Q8H    sodium chloride (NS) flush 5-10 mL  5-10 mL IntraVENous PRN    acetaminophen (TYLENOL) tablet 650 mg  650 mg Oral Q4H PRN    ondansetron (ZOFRAN) injection 4 mg  4 mg IntraVENous Q4H PRN    heparin (porcine) injection 5,000 Units  5,000 Units SubCUTAneous Q8H    0.9% sodium chloride infusion  100 mL/hr IntraVENous CONTINUOUS    pantoprazole (PROTONIX) tablet 40 mg  40 mg Oral ACB    predniSONE (DELTASONE) tablet 5 mg  5 mg Oral DAILY WITH BREAKFAST    mycophenolate (CELLCEPT) tablet 500 mg  500 mg Oral BID    tamsulosin (FLOMAX) capsule 0.4 mg  0.4 mg Oral DAILY    tacrolimus (PROGRAF) capsule 6 mg  6 mg Oral Q12H           There is no immunization history on file for this patient.   Objective:   Patient Vitals for the past 24 hrs:   Temp Pulse Resp BP SpO2   18 1521 97.9 °F (36.6 °C) 63 17 105/76 94 % 18 1154 98.1 °F (36.7 °C) 75 19 116/69 93 %   18 0822 98.1 °F (36.7 °C) 82 18 119/66 93 %   18 0552 98.5 °F (36.9 °C) 81 18 133/90 90 %   18 99 °F (37.2 °C) 82 18 157/80 92 %   18 1826 98.2 °F (36.8 °C) 78 17 149/75 99 %   18 1532 - - 14 128/62 94 %     Temp (24hrs), Av.3 °F (36.8 °C), Min:97.9 °F (36.6 °C), Max:99 °F (37.2 °C)    Oxygen Therapy  O2 Sat (%): 94 % (18 1521)  Pulse via Oximetry: 79 beats per minute (18 1532)  O2 Device: Room air (18 0710)  Oxygen Therapy  O2 Sat (%): 94 % (18 1521)  Pulse via Oximetry: 79 beats per minute (18 1532)  O2 Device: Room air (18 0710)    Physical Exam:  General:         Alert, cooperative, no distress   HEENT:               NCAT. No obvious deformity. Lungs:  CTABL. No wheezing/rhonchi/rales  Cardiovascular:   RRR. No m/r/g. No pedal edema b/l. +2 PT/DT pulses b/l. Abdomen:       S/nt/nd. Bowel sounds normal. .   Skin:         No rashes or lesions. Not Jaundiced  Neurologic:    AAOx3. No gross focal deficit. Psychiatric:         Good mood. Normal affect. DIAGNOSTIC STUDIES      Data Review:   Recent Results (from the past 24 hour(s))   C.  DIFFICILE/EPI PCR    Collection Time: 18 11:26 PM   Result Value Ref Range    Special Requests: NO SPECIAL REQUESTS      Culture result: NEGATIVE       Culture result:        Toxigenic C. diff NEGATIVE/ 027-NAP1-BI PRESUMPTIVE NEGATIVE   INFLUENZA A & B AG (RAPID TEST)    Collection Time: 18 11:27 PM   Result Value Ref Range    Influenza A Ag NEGATIVE  NEG      Influenza B Ag NEGATIVE  NEG     CREATININE, UR, RANDOM    Collection Time: 18  4:05 AM   Result Value Ref Range    Creatinine, urine 554.00 mg/dL   SODIUM, UR, RANDOM    Collection Time: 18  4:05 AM   Result Value Ref Range    Sodium urine, random 25 MMOL/L   EOSINOPHILS, URINE    Collection Time: 18  4:05 AM   Result Value Ref Range Eosinophils,urine POSITIVE (A) NEG     URINALYSIS W/ RFLX MICROSCOPIC    Collection Time: 01/17/18  4:05 AM   Result Value Ref Range    Color XANDER      Appearance CLOUDY      Specific gravity 1.020 1.001 - 1.023      pH (UA) 5.5 5.0 - 9.0      Protein 30 (A) NEG mg/dL    Glucose NEGATIVE  mg/dL    Ketone NEGATIVE  NEG mg/dL    Bilirubin SMALL (A) NEG      Blood MODERATE (A) NEG      Urobilinogen 0.2 0.2 - 1.0 EU/dL    Nitrites NEGATIVE  NEG      Leukocyte Esterase SMALL (A) NEG      WBC 20-50 0 /hpf    RBC 3-5 0 /hpf    Epithelial cells 0-3 0 /hpf    Bacteria 0 0 /hpf    Casts 2-46 0 /lpf   METABOLIC PANEL, BASIC    Collection Time: 01/17/18  6:14 AM   Result Value Ref Range    Sodium 137 136 - 145 mmol/L    Potassium 3.8 3.5 - 5.1 mmol/L    Chloride 105 98 - 107 mmol/L    CO2 20 (L) 21 - 32 mmol/L    Anion gap 12 7 - 16 mmol/L    Glucose 113 (H) 65 - 100 mg/dL    BUN 45 (H) 6 - 23 MG/DL    Creatinine 3.02 (H) 0.8 - 1.5 MG/DL    GFR est AA 28 (L) >60 ml/min/1.73m2    GFR est non-AA 23 (L) >60 ml/min/1.73m2    Calcium 7.9 (L) 8.3 - 10.4 MG/DL       All Micro Results     Procedure Component Value Units Date/Time    C. DIFFICILE/EPI PCR [[id number]] Collected:  01/16/18 2326    Order Status:  Completed Specimen:  Stool Updated:  01/17/18 0342     Special Requests: NO SPECIAL REQUESTS        Culture result: NEGATIVE                  Toxigenic C. diff NEGATIVE/ 027-NAP1-BI PRESUMPTIVE NEGATIVE    INFLUENZA A & B AG (RAPID TEST) [[id number]] Collected:  01/16/18 2327    Order Status:  Completed Specimen:  Nasopharyngeal from Nasal washing Updated:  01/17/18 0032     Influenza A Ag NEGATIVE          NEGATIVE FOR THE PRESENCE OF INFLUENZA A ANTIGEN  INFECTION DUE TO INFLUENZA A CANNOT BE RULED OUT. BECAUSE THE ANTIGEN PRESENT IN THE SAMPLE MAY BE BELOW  THE DETECTION LIMIT OF THE TEST.   A NEGATIVE TEST IS PRESUMPTIVE AND IT IS RECOMMENDED THAT THESE RESULTS BE CONFIRMED BY VIRAL CULTURE OR AN FDA-CLEARED INFLUENZA A AND B MOLECULAR ASSAY. Influenza B Ag NEGATIVE          NEGATIVE FOR THE PRESENCE OF INFLUENZA B ANTIGEN  INFECTION DUE TO INFLUENZA B CANNOT BE RULED OUT. BECAUSE THE ANTIGEN PRESENT IN THE SAMPLE MAY BE BELOW  THE DETECTION LIMIT OF THE TEST. A NEGATIVE TEST IS PRESUMPTIVE AND IT IS RECOMMENDED THAT THESE RESULTS BE CONFIRMED BY VIRAL CULTURE OR AN FDA-CLEARED INFLUENZA A AND B MOLECULAR ASSAY. Imaging /Procedures /Studies:    CXR Results  (Last 48 hours)    None        CT Results  (Last 48 hours)               01/16/18 1225  CT ABD PELV WO CONT Final result    Impression:  IMPRESSION: Status post post bilateral nephrectomy, nonobstructive calculi   within the left lower quadrant renal transplant, possible mild small bowel   ileus. Narrative:  ABDOMINAL CT without CONTRAST: 1/16/2018   Indication: Nausea vomiting diarrhea x24 hours, history renal transplant   Comparison: None   Scratch at Automated exposure control, adjustment of the mAs and/or kVp   according to patient size, standardized low-dose protocol, and/or iterative   reconstruction technique. .       Findings: The lung bases are clear. The heart is enlarged. The unenhanced liver, collapsed   gallbladder, adrenal glands, pancreas, spleen, and aorta is unremarkable. Patient status post bilateral nephrectomy and appendectomy and there is a left   lower quadrant renal transplant. There are a few less than 3 mm nonobstructive   calculi within the transplant kidney. There is a mild fluid distention of the   small bowel. There is no significant adenopathy. CT scan of pelvis without contrast:       The prostate, seminal vesicles, bladder, soft tissues, and the bony structures   are unremarkable. Us Renal Transplant Eval    Result Date: 1/16/2018  IMPRESSION: Unremarkable left iliac fossa renal transplant. No results found for this visit on 01/16/18.     Labs and Studies from previous 24 hours have been personally reviewed by myself Alba Problems    Diagnosis Date Noted    Acute on chronic kidney failure ([facility] 01/16/2018    HTN (hypertension) 01/16/2018    ESRD (end stage renal disease) ([facility] 01/16/2018     Hospital Problems as of 1/17/2018  Never Reviewed          Codes Class Noted - Resolved POA    * (Principal)Acute on chronic kidney failure ([facility] ICD-10-CM: N17.9, N18.9  ICD-9-CM: 584.9, 585.9  1/16/2018 - Present Unknown        HTN (hypertension) ICD-10-CM: I10  ICD-9-CM: 401.9  1/16/2018 - Present Unknown        ESRD (end stage renal disease) ([facility] ICD-10-CM: N18.6  ICD-9-CM: 585.6  1/16/2018 - Present Unknown              A/P:  -Acute on chronic kidney injury  Improving  Nephrology eval appreciated  Cont gentle hydration  With IVFs  Cont immunosuppressive therapy for kidney transplant    -Gastroenteritis  Improving  Likely viral  Influenza and c.difficile neg  Hydration as above  Will consider imaging studies if not improvement    -HTN  Controlled  Hold home meds to prevent hypotension      DVT Prophylaxis: heparin  CODE Status: full  Plan of Care Discussed with: patient.  Care team.    Mat Hannon MD  01/17/18

## 2018-01-17 NOTE — CONSULTS
[facility] Consultation    Admission Date:  2018    Admission Diagnosis:  Acute on chronic kidney failure [facility])    History of Present Illness:  Pt is a 64 yo AAM with kidney transplant x 2, most recently a  donor tranpslant at [address] in . He follows Dr. Julian Martinez in our office. Cr in 2017 has been about 1.4-1.7. He presents with N/V and severe diarrhea since yesterday with sick contacts. Cr is 3.55.     Past Medical History:   Diagnosis Date    Chronic Kidney Disease     renal transplant    GERD (Gastroesophageal Reflux Disease)     Hypertension     PUD (Peptic Ulcer Disease)     Thromboembolus ([address]       Past Surgical History:   Procedure Laterality Date    HX TRANSPLANT      kidney      Current Facility-Administered Medications   Medication Dose Route Frequency    sodium chloride (NS) flush 5-10 mL  5-10 mL IntraVENous Q8H    sodium chloride (NS) flush 5-10 mL  5-10 mL IntraVENous PRN    acetaminophen (TYLENOL) tablet 650 mg  650 mg Oral Q4H PRN    ondansetron (ZOFRAN) injection 4 mg  4 mg IntraVENous Q4H PRN    heparin (porcine) injection 5,000 Units  5,000 Units SubCUTAneous Q8H    0.9% sodium chloride infusion  100 mL/hr IntraVENous CONTINUOUS    [START ON 2018] pantoprazole (PROTONIX) tablet 40 mg  40 mg Oral ACB    pantoprazole (PROTONIX) 40 mg in sodium chloride 0.9 % 10 mL injection  40 mg IntraVENous ONCE    [START ON 2018] predniSONE (DELTASONE) tablet 5 mg  5 mg Oral DAILY WITH BREAKFAST    [START ON 2018] sirolimus tab 8 mg (Patient Supplied)  8 mg Oral DAILY    mycophenolate (CELLCEPT) tablet 500 mg  500 mg Oral BID    [START ON 2018] tamsulosin (FLOMAX) capsule 0.4 mg  0.4 mg Oral DAILY     No Known Allergies   Social History   Substance Use Topics    Smoking status: Never Smoker    Smokeless tobacco: Not on file    Alcohol use No      Family History   Problem Relation Age of Onset    Alcohol abuse Neg Hx     Arthritis-rheumatoid Neg Hx     Asthma Neg Hx     Bleeding Prob Neg Hx     Cancer Neg Hx     Diabetes Neg Hx     Elevated Lipids Neg Hx     Headache Neg Hx     Heart Disease Neg Hx     Hypertension Neg Hx     Lung Disease Neg Hx     Migraines Neg Hx     Psychiatric Disorder Neg Hx     Stroke Neg Hx     Mental Retardation Neg Hx         Review of Systems:  Gen - no fever, appetite unchanged  CV - no chest pain, no palpitation  Lung - no shortness of breath, no cough  Abd - + nausea/vomiting, + diarrhea  Ext - no edema  Musculoskeletal - no joint pain  Neurologic - no headaches, no dizziness  Skin - no rashes, no purpura  Genitourinary - no change in urine output    Objective:  Vitals:    01/16/18 0710 01/16/18 1532 01/16/18 1826   BP: 128/82 128/62 149/75   Pulse: 95  78   Resp: 16 14 17   Temp: 97.9 °F (36.6 °C)  98.2 °F (36.8 °C)   SpO2: 94% 94% 99%     No intake or output data in the 24 hours ending 01/16/18 2026  Wt Readings from Last 3 Encounters:   08/07/09 122.5 kg (270 lb)       GEN - in no distress, alert and oriented  Neck - no JVD  CV - regular, no murmur, no rub  Lung - clear bilaterally, lungs expand symmetrically  Chest wall - normal appearance  Abd - soft, nontender, bowel sounds present  Ext - no edema  Neurologic - nonfocal  Genitourinary - bladder nonpalpable  Skin - no rashes, no purpura      Data Review:     Recent Labs      01/16/18   1007   WBC  7.8   HGB  16.9   HCT  49.2   PLT  175        Recent Labs      01/16/18   1007   NA  139   K  4.6   CL  101   CO2  25   BUN  34*   CREA  3.55*   CA  9.9   GLU  150*         Assessment:     Principal Problem:    Acute on chronic kidney failure (HCC) (1/16/2018)    Active Problems:    HTN (hypertension) (1/16/2018)      ESRD (end stage renal disease) ([facility] (1/16/2018)        Plan:     1.  Renal transplant with [facility] - current transplant (second) in place since 2005  - Baseline Cr about 1.4-1.7 in 2017  - Admission Cr 3.55, likely volume depletion  - Agree with IVF  - Transplant US done and results pending  - Will check UA  - He thinks he may have threw up meds last night and did not take this AM.  Hopefully he is well enough to restart immunosuppressives  - Not sure how sirolimus ended up listed as his home meds. I checked our EMR and confirmed that he is on Prograf 6 mg BID and will correct.   2. N/V with diarrhea

## 2018-01-17 NOTE — PROGRESS NOTES
Renal Progress Note    Admission Date: 1/16/2018   Subjective:      Some better    Objective:     Physical Exam:    Patient Vitals for the past 8 hrs:   BP Temp Pulse Resp SpO2 Weight   01/17/18 1154 116/69 98.1 °F (36.7 °C) 75 19 93 % -   01/17/18 0822 119/66 98.1 °F (36.7 °C) 82 18 93 % -   01/17/18 0552 133/90 98.5 °F (36.9 °C) 81 18 90 % -   01/17/18 0545 - - - - - 122.6 kg (270 lb 3.2 oz)      Gen: comfortable , NAD  HEENT: Dry membranes  CV: S1, S2  Lungs: Clear bilaterally  Extem: no edema     Current Facility-Administered Medications   Medication Dose Route Frequency    sodium chloride (NS) flush 5-10 mL  5-10 mL IntraVENous Q8H    sodium chloride (NS) flush 5-10 mL  5-10 mL IntraVENous PRN    acetaminophen (TYLENOL) tablet 650 mg  650 mg Oral Q4H PRN    ondansetron (ZOFRAN) injection 4 mg  4 mg IntraVENous Q4H PRN    heparin (porcine) injection 5,000 Units  5,000 Units SubCUTAneous Q8H    0.9% sodium chloride infusion  100 mL/hr IntraVENous CONTINUOUS    pantoprazole (PROTONIX) tablet 40 mg  40 mg Oral ACB    predniSONE (DELTASONE) tablet 5 mg  5 mg Oral DAILY WITH BREAKFAST    mycophenolate (CELLCEPT) tablet 500 mg  500 mg Oral BID    tamsulosin (FLOMAX) capsule 0.4 mg  0.4 mg Oral DAILY    tacrolimus (PROGRAF) capsule 6 mg  6 mg Oral Q12H            Data Review:     LABS:   Recent Results (from the past 12 hour(s))   CREATININE, UR, RANDOM    Collection Time: 01/17/18  4:05 AM   Result Value Ref Range    Creatinine, urine 554.00 mg/dL   SODIUM, UR, RANDOM    Collection Time: 01/17/18  4:05 AM   Result Value Ref Range    Sodium urine, random 25 MMOL/L   EOSINOPHILS, URINE    Collection Time: 01/17/18  4:05 AM   Result Value Ref Range    Eosinophils,urine POSITIVE (A) NEG     URINALYSIS W/ RFLX MICROSCOPIC    Collection Time: 01/17/18  4:05 AM   Result Value Ref Range    Color XANDER      Appearance CLOUDY      Specific gravity 1.020 1.001 - 1.023      pH (UA) 5.5 5.0 - 9.0      Protein 30 (A) NEG mg/dL    Glucose NEGATIVE  mg/dL    Ketone NEGATIVE  NEG mg/dL    Bilirubin SMALL (A) NEG      Blood MODERATE (A) NEG      Urobilinogen 0.2 0.2 - 1.0 EU/dL    Nitrites NEGATIVE  NEG      Leukocyte Esterase SMALL (A) NEG      WBC 20-50 0 /hpf    RBC 3-5 0 /hpf    Epithelial cells 0-3 0 /hpf    Bacteria 0 0 /hpf    Casts 2-74 0 /lpf   METABOLIC PANEL, BASIC    Collection Time: 01/17/18  6:14 AM   Result Value Ref Range    Sodium 137 136 - 145 mmol/L    Potassium 3.8 3.5 - 5.1 mmol/L    Chloride 105 98 - 107 mmol/L    CO2 20 (L) 21 - 32 mmol/L    Anion gap 12 7 - 16 mmol/L    Glucose 113 (H) 65 - 100 mg/dL    BUN 45 (H) 6 - 23 MG/DL    Creatinine 3.02 (H) 0.8 - 1.5 MG/DL    GFR est AA 28 (L) >60 ml/min/1.73m2    GFR est non-AA 23 (L) >60 ml/min/1.73m2    Calcium 7.9 (L) 8.3 - 10.4 MG/DL         Plan:     Principal Problem:    Acute on chronic kidney failure (HCC) (1/16/2018)    Active Problems:    HTN (hypertension) (1/16/2018)      ESRD (end stage renal disease) ([facility] (1/16/2018)      1. Renal transplant with [facility] - current transplant (second) in place since 2005  - Baseline Cr about 1.4-1.7 in 2017  - Admission Cr 3.55, likely volume depletion- improving with IVF    - Transplant US- with normal sized allograft with no hydronephrosis  - immunosuppression on prograf, cellcept, and prednisone  2.  N/V with diarrhea

## 2018-01-17 NOTE — PROGRESS NOTES
Pt arrived onto unit with personal belongings. Oriented to room/surroundings. Dual skin assessment performed with Mayra Brizulea RN. No skin breakdowns noted. Will continue to monitor.

## 2018-01-17 NOTE — PROGRESS NOTES
Hourly rounds done. Pt c/o nausea, medicated per MAR. Denies pain, vomiting. Sweeney output 400 mL, yellow/clear. Total 3 loose, watery BMs this shift. UA, stool samples obtained. All needs met at this time.

## 2018-01-18 LAB
ANION GAP SERPL CALC-SCNC: 11 MMOL/L (ref 7–16)
BUN SERPL-MCNC: 40 MG/DL (ref 6–23)
CALCIUM SERPL-MCNC: 7.5 MG/DL (ref 8.3–10.4)
CHLORIDE SERPL-SCNC: 107 MMOL/L (ref 98–107)
CO2 SERPL-SCNC: 20 MMOL/L (ref 21–32)
CREAT SERPL-MCNC: 2.32 MG/DL (ref 0.8–1.5)
GLUCOSE SERPL-MCNC: 112 MG/DL (ref 65–100)
POTASSIUM SERPL-SCNC: 3.8 MMOL/L (ref 3.5–5.1)
SODIUM SERPL-SCNC: 138 MMOL/L (ref 136–145)

## 2018-01-18 PROCEDURE — 99218 HC RM OBSERVATION: CPT

## 2018-01-18 PROCEDURE — 74011250636 HC RX REV CODE- 250/636: Performed by: HOSPITALIST

## 2018-01-18 PROCEDURE — 74011250637 HC RX REV CODE- 250/637: Performed by: INTERNAL MEDICINE

## 2018-01-18 PROCEDURE — 36415 COLL VENOUS BLD VENIPUNCTURE: CPT | Performed by: INTERNAL MEDICINE

## 2018-01-18 PROCEDURE — 74011250636 HC RX REV CODE- 250/636: Performed by: INTERNAL MEDICINE

## 2018-01-18 PROCEDURE — 74011636637 HC RX REV CODE- 636/637: Performed by: INTERNAL MEDICINE

## 2018-01-18 PROCEDURE — 74011000250 HC RX REV CODE- 250: Performed by: INTERNAL MEDICINE

## 2018-01-18 PROCEDURE — 87086 URINE CULTURE/COLONY COUNT: CPT | Performed by: INTERNAL MEDICINE

## 2018-01-18 PROCEDURE — 65270000029 HC RM PRIVATE

## 2018-01-18 PROCEDURE — 80048 BASIC METABOLIC PNL TOTAL CA: CPT | Performed by: INTERNAL MEDICINE

## 2018-01-18 PROCEDURE — 97161 PT EVAL LOW COMPLEX 20 MIN: CPT

## 2018-01-18 RX ORDER — LISINOPRIL 10 MG/1
10 TABLET ORAL DAILY
COMMUNITY
End: 2018-01-20

## 2018-01-18 RX ORDER — TACROLIMUS 5 MG/1
5 CAPSULE ORAL DAILY
COMMUNITY
End: 2019-03-05

## 2018-01-18 RX ORDER — TACROLIMUS 1 MG/1
1 CAPSULE ORAL DAILY
COMMUNITY

## 2018-01-18 RX ORDER — CHOLECALCIFEROL (VITAMIN D3) 125 MCG
2000 CAPSULE ORAL DAILY
COMMUNITY

## 2018-01-18 RX ORDER — GABAPENTIN 600 MG/1
300 TABLET ORAL DAILY
COMMUNITY

## 2018-01-18 RX ORDER — LOPERAMIDE HYDROCHLORIDE 2 MG/1
4 CAPSULE ORAL ONCE
Status: COMPLETED | OUTPATIENT
Start: 2018-01-18 | End: 2018-01-18

## 2018-01-18 RX ORDER — TRAMADOL HYDROCHLORIDE 50 MG/1
50 TABLET ORAL 2 TIMES DAILY
COMMUNITY

## 2018-01-18 RX ORDER — FERROUS SULFATE 324(65)MG
TABLET, DELAYED RELEASE (ENTERIC COATED) ORAL
COMMUNITY

## 2018-01-18 RX ORDER — ROSUVASTATIN CALCIUM 10 MG/1
10 TABLET, COATED ORAL
COMMUNITY

## 2018-01-18 RX ADMIN — MYCOPHENOLATE MOFETIL 500 MG: 500 TABLET ORAL at 17:27

## 2018-01-18 RX ADMIN — MYCOPHENOLATE MOFETIL 500 MG: 500 TABLET ORAL at 08:20

## 2018-01-18 RX ADMIN — HEPARIN SODIUM 5000 UNITS: 5000 INJECTION, SOLUTION INTRAVENOUS; SUBCUTANEOUS at 12:01

## 2018-01-18 RX ADMIN — TAMSULOSIN HYDROCHLORIDE 0.4 MG: 0.4 CAPSULE ORAL at 08:20

## 2018-01-18 RX ADMIN — Medication 10 ML: at 05:01

## 2018-01-18 RX ADMIN — MYCOPHENOLATE MOFETIL 500 MG: 500 TABLET ORAL at 22:00

## 2018-01-18 RX ADMIN — TACROLIMUS 6 MG: 1 CAPSULE ORAL at 08:20

## 2018-01-18 RX ADMIN — HEPARIN SODIUM 5000 UNITS: 5000 INJECTION, SOLUTION INTRAVENOUS; SUBCUTANEOUS at 04:55

## 2018-01-18 RX ADMIN — HEPARIN SODIUM 5000 UNITS: 5000 INJECTION, SOLUTION INTRAVENOUS; SUBCUTANEOUS at 20:37

## 2018-01-18 RX ADMIN — Medication 10 ML: at 22:00

## 2018-01-18 RX ADMIN — WATER 1 G: 1 INJECTION INTRAMUSCULAR; INTRAVENOUS; SUBCUTANEOUS at 08:27

## 2018-01-18 RX ADMIN — LOPERAMIDE HYDROCHLORIDE 4 MG: 2 CAPSULE ORAL at 17:26

## 2018-01-18 RX ADMIN — PREDNISONE 5 MG: 5 TABLET ORAL at 08:20

## 2018-01-18 RX ADMIN — Medication 10 ML: at 14:00

## 2018-01-18 RX ADMIN — TACROLIMUS 6 MG: 1 CAPSULE ORAL at 20:37

## 2018-01-18 RX ADMIN — SODIUM CHLORIDE 100 ML/HR: 900 INJECTION, SOLUTION INTRAVENOUS at 20:38

## 2018-01-18 RX ADMIN — PANTOPRAZOLE SODIUM 40 MG: 40 TABLET, DELAYED RELEASE ORAL at 05:00

## 2018-01-18 RX ADMIN — SODIUM CHLORIDE 100 ML/HR: 900 INJECTION, SOLUTION INTRAVENOUS at 08:27

## 2018-01-18 NOTE — PROGRESS NOTES
Hourly rounds done. Pt denies pain, nausea, vomiting. C/o slight numbness on Left Upper Thigh anteriorly that comes/goes. \"Feels rubbery\". Sensation posteriorly is normal.  Sweeney output 1000 mL, orange/clear. 3 Med brown loose/watery stools this shift. All needs met at this time.

## 2018-01-18 NOTE — PROGRESS NOTES
Hospitalist Progress Note    2018  Admit Date: 2018  9:05 AM   NAME: Ade Smith   :  1960   DOS:              18  MRN:  [id number]   Attending: Chavez Yi MD  PCP:  Jack Ordoñez MD  Treatment Team: Attending Provider: Chavez Yi MD; Consulting Provider: Dom Kumar MD; Utilization Review: Candelaria Kelly RN    Full Code     SUBJECTIVE:   As previously documented: 65years old M with PMH of ESRD s/p kidney transplant, HTN presented to the ED complaining of nausea, vomiting and diarrhea. Patient admitted on the  for acute on chronic kidney injury and gastroenteritis. Renal function improving with IVFs and gastroenteritis likely viral resolving. Nephrology eval appreciated       18    Ade Smith stated still having watery diarrhea but less episodes. Patient also complaining his skin on R tight is slightly numb since last night. 10+ ROS reviewed and negative except for positive in HPI.    No Known Allergies  Current Facility-Administered Medications   Medication Dose Route Frequency    cefTRIAXone (ROCEPHIN) 1 g in sterile water (preservative free) 10 mL IV syringe  1 g IntraVENous Q24H    mycophenolate (CELLCEPT) tablet 500 mg  500 mg Oral TID    sodium chloride (NS) flush 5-10 mL  5-10 mL IntraVENous Q8H    sodium chloride (NS) flush 5-10 mL  5-10 mL IntraVENous PRN    acetaminophen (TYLENOL) tablet 650 mg  650 mg Oral Q4H PRN    ondansetron (ZOFRAN) injection 4 mg  4 mg IntraVENous Q4H PRN    heparin (porcine) injection 5,000 Units  5,000 Units SubCUTAneous Q8H    0.9% sodium chloride infusion  100 mL/hr IntraVENous CONTINUOUS    pantoprazole (PROTONIX) tablet 40 mg  40 mg Oral ACB    predniSONE (DELTASONE) tablet 5 mg  5 mg Oral DAILY WITH BREAKFAST    tamsulosin (FLOMAX) capsule 0.4 mg  0.4 mg Oral DAILY    tacrolimus (PROGRAF) capsule 6 mg  6 mg Oral Q12H           There is no immunization history on file for this patient. Objective:     Patient Vitals for the past 24 hrs:   Temp Pulse Resp BP SpO2   18 1049 98.6 °F (37 °C) 74 18 119/58 96 %   18 0704 99 °F (37.2 °C) 72 18 135/74 94 %   18 0409 99.1 °F (37.3 °C) 70 17 131/78 92 %   18 2319 97.6 °F (36.4 °C) 71 17 134/60 92 %   18 1922 98.3 °F (36.8 °C) 72 17 120/80 94 %   18 1521 97.9 °F (36.6 °C) 63 17 105/76 94 %     Temp (24hrs), Av.4 °F (36.9 °C), Min:97.6 °F (36.4 °C), Max:99.1 °F (37.3 °C)    Oxygen Therapy  O2 Sat (%): 96 % (18 1049)  Pulse via Oximetry: 79 beats per minute (18 1532)  O2 Device: Room air (18 0710)  Oxygen Therapy  O2 Sat (%): 96 % (18 1049)  Pulse via Oximetry: 79 beats per minute (18 1532)  O2 Device: Room air (18 0710)    Physical Exam:  General:         Alert, cooperative, no distress   HEENT:               NCAT. No obvious deformity. Lungs:  CTABL. No wheezing/rhonchi/rales  Cardiovascular:   RRR. No m/r/g. No pedal edema b/l. +2 PT/DT pulses b/l. Abdomen:       S/nt/nd. Bowel sounds normal. .   Skin:         No rashes or lesions. Not Jaundiced  Neurologic:    AAOx3. LLE: sensation slightly decrease on L thigh. Muscle strength and pedal pulse intact. Psychiatric:         Good mood. Normal affect.                DIAGNOSTIC STUDIES      Data Review:   Recent Results (from the past 24 hour(s))   METABOLIC PANEL, BASIC    Collection Time: 18  5:04 AM   Result Value Ref Range    Sodium 138 136 - 145 mmol/L    Potassium 3.8 3.5 - 5.1 mmol/L    Chloride 107 98 - 107 mmol/L    CO2 20 (L) 21 - 32 mmol/L    Anion gap 11 7 - 16 mmol/L    Glucose 112 (H) 65 - 100 mg/dL    BUN 40 (H) 6 - 23 MG/DL    Creatinine 2.32 (H) 0.8 - 1.5 MG/DL    GFR est AA 38 (L) >60 ml/min/1.73m2    GFR est non-AA 31 (L) >60 ml/min/1.73m2    Calcium 7.5 (L) 8.3 - 10.4 MG/DL       All Micro Results     Procedure Component Value Units Date/Time    CULTURE, URINE [[id number]] Collected:  18 5561    Order Status:  Completed Specimen:  Urine from [place] Specimen Updated:  01/18/18 0857    C. DIFFICILE/EPI PCR [[id number]] Collected:  01/16/18 2326    Order Status:  Completed Specimen:  Stool Updated:  01/17/18 0342     Special Requests: NO SPECIAL REQUESTS        Culture result: NEGATIVE                  Toxigenic C. diff NEGATIVE/ 027-NAP1-BI PRESUMPTIVE NEGATIVE    INFLUENZA A & B AG (RAPID TEST) [[id number]] Collected:  01/16/18 2327    Order Status:  Completed Specimen:  Nasopharyngeal from Nasal washing Updated:  01/17/18 0032     Influenza A Ag NEGATIVE          NEGATIVE FOR THE PRESENCE OF INFLUENZA A ANTIGEN  INFECTION DUE TO INFLUENZA A CANNOT BE RULED OUT. BECAUSE THE ANTIGEN PRESENT IN THE SAMPLE MAY BE BELOW  THE DETECTION LIMIT OF THE TEST. A NEGATIVE TEST IS PRESUMPTIVE AND IT IS RECOMMENDED THAT THESE RESULTS BE CONFIRMED BY VIRAL CULTURE OR AN FDA-CLEARED INFLUENZA A AND B MOLECULAR ASSAY. Influenza B Ag NEGATIVE          NEGATIVE FOR THE PRESENCE OF INFLUENZA B ANTIGEN  INFECTION DUE TO INFLUENZA B CANNOT BE RULED OUT. BECAUSE THE ANTIGEN PRESENT IN THE SAMPLE MAY BE BELOW  THE DETECTION LIMIT OF THE TEST. A NEGATIVE TEST IS PRESUMPTIVE AND IT IS RECOMMENDED THAT THESE RESULTS BE CONFIRMED BY VIRAL CULTURE OR AN FDA-CLEARED INFLUENZA A AND B MOLECULAR ASSAY. Imaging /Procedures /Studies:    CXR Results  (Last 48 hours)    None        CT Results  (Last 48 hours)    None        No results found. No results found for this visit on 01/16/18.     Labs and Studies from previous 24 hours have been personally reviewed by myself [place] Problems    Diagnosis Date Noted    Acute on chronic kidney failure ([facility] 01/16/2018    HTN (hypertension) 01/16/2018    ESRD (end stage renal disease) ([facility] 01/16/2018     Hospital Problems as of 1/18/2018  Never Reviewed          Codes Class Noted - Resolved POA    * (Principal)Acute on chronic kidney failure [facility]) ICD-10-CM: N17.9, N18.9  ICD-9-CM: 584.9, 585.9  1/16/2018 - Present Unknown        HTN (hypertension) ICD-10-CM: I10  ICD-9-CM: 401.9  1/16/2018 - Present Unknown        ESRD (end stage renal disease) ([facility] ICD-10-CM: N18.6  ICD-9-CM: 585.6  1/16/2018 - Present Unknown              A/P:  -Acute on chronic kidney injury  Cr trending down  Nephrology eval appreciated  Cont gentle hydration  With IVFs  Cont immunosuppressive therapy for kidney transplant    -Gastroenteritis  Improving, Likely viral  S/p CT abdomen  Influenza and c.difficile neg  Hydration as above    -HTN  Controlled  Hold home meds to prevent hypotension    -Paresthesia  Patient reported is improving  Could be from pressure on a nerve while sleeping  Will monitor, if not improvement will give gabapentin trial.    -Suspected UTI  UA having 20-50 wbc  Start ceftriaxone and send urine cultures  Rapid de escalation of abxs if urine culture is neg      DVT Prophylaxis: heparin  CODE Status: full  Plan of Care Discussed with: patient.  Care team.    Miriam Koehler MD  01/18/18

## 2018-01-18 NOTE — PHYSICIAN ADVISORY
Letter of Determination: Upgrade from Observation to Inpatient Status    This patient was originally hospitalized as observation status on 1/16/2018 for acute on chronic renal failure. This patient now meets for Inpatient Admission based on medical necessity. The patient's stay was medically necessary based on creatinine increased from baseline of 1.4 mg/dl to 3.55 mg/dl, with medical history of renal transplant on chronic immunosuppresive therapy with tacrolimus 5 mg orally. It is our recommendation that this patient's hospitalization status should be upgraded from OBSERVATION to INPATIENT status.      The final decision regarding the patient's hospitalization status depends on the attending physician's judgement.     Yue Seay MD, ROBERTO,   Physician [place].

## 2018-01-18 NOTE — PROGRESS NOTES
[facility] progress note    Follow-Up on: 1/18/2018     Patient seen and examined. Feeling better. He is reporting a lot of reflux-type symptomatology. Also some parastesias involving his left upper leg. ROS:  Gen - no fever, no chills, appetite improving  CV - no chest pain, no orthopnea  Lung - no shortness of breath, no cough  Abd - no tenderness, no further nausea, no further vomiting  Ext - no edema    Exam:  Vitals:    01/18/18 0409 01/18/18 0411 01/18/18 0704 01/18/18 1049   BP: 131/78  135/74 119/58   Pulse: 70  72 74   Resp: 17 18 18   Temp: 99.1 °F (37.3 °C)  99 °F (37.2 °C) 98.6 °F (37 °C)   SpO2: 92%  94% 96%   Weight:  124.3 kg (274 lb)           Intake/Output Summary (Last 24 hours) at 01/18/18 1135  Last data filed at 01/18/18 1115   Gross per 24 hour   Intake             3506 ml   Output             2200 ml   Net             1306 ml       GEN - in no distress  CV - S1, S2, no rub  Lung - clear bilaterally  Abd - soft, nontender  Ext - no edema    Recent Labs      01/16/18   1007   WBC  7.8   HGB  16.9   HCT  49.2   PLT  175        Recent Labs      01/18/18   0504  01/17/18   0614  01/16/18   1007   NA  138  137  139   K  3.8  3.8  4.6   CL  107  105  101   CO2  20*  20*  25   BUN  40*  45*  34*   CREA  2.32*  3.02*  3.55*   CA  7.5*  7.9*  9.9   GLU  112*  113*  150*       Assessment / Plan:    1. KARLA - pre-renal.  Allograft function stabilizing. 2.  S/p ddKTx (2005) with underlying CAN with baseline creatinine 1.4 - 1.7 mg/dL. Underlying Stage III CKD. He is tolerating ISP therapy. 3.  HTN - stable off Rx.    4.  GERD - on PPI    Will monitor parastesia involving his left thigh. Likely a nerve. Clinically improving. Would continue IVF for at least another 24 hours.

## 2018-01-18 NOTE — PROGRESS NOTES
Problem: Mobility Impaired (Adult and Pediatric)  Goal: *Acute Goals and Plan of Care (Insert Text)    PHYSICAL THERAPY: Initial Assessment, Discharge, Treatment Day: Day of Assessment, PM 1/18/2018  INPATIENT: Hospital Day: 3  Payor: [address] [facility] / Plan: [address] / Product Type: PPO /      NAME/AGE/GENDER: Giuseppe Rawls is a 65 y.o. male   PRIMARY DIAGNOSIS: Acute on chronic kidney failure ([facility]  Acute on chronic kidney failure (HCC) Acute on chronic kidney failure (HCC) Acute on chronic kidney failure (HCC)        ICD-10: Treatment Diagnosis:   · Generalized Muscle Weakness (M62.81)   Precaution/Allergies:  Review of patient's allergies indicates no known allergies. ASSESSMENT:     Mr. Miles Sparks is sitting up in bedside chair upon contact and agreeable to PT evaluation this afternoon. Pt reports 0/10 pain prior to mobility. Pt reports living in 1 story home with his wife with 3 steps to enter. Pt is independent with gait and ADLs, 0 falls, drives, and works 2 jobs. Pt reports living a fairly active lifestyle. Pt performed sit to stand and donned pajama pants in standing without any unsteadiness or LOB. Pt ambulated 120 ft in hallway independently while managing IV pole. Pt did not want to further gait distance this session. Pt returned to room and left sitting up in bedside chair with all needs met and within reach. Pt is currently functioning at baseline mobility and is safe to be up ad liana. Will discharge from PT services at this time. This section established at most recent assessment   PROBLEM LIST (Impairments causing functional limitations):  1. Decreased Activity Tolerance  2.  Increased Fatigue   INTERVENTIONS PLANNED: (Benefits and precautions of physical therapy have been discussed with the patient.)  1. n/a     TREATMENT PLAN: Frequency/Duration: 1x visit       RECOMMENDED REHABILITATION/EQUIPMENT: (at time of discharge pending progress): Due to the probability of continued deficits (see above) this patient will not likely need continued skilled physical therapy after discharge. Equipment:    None at this time              HISTORY:   History of Present Injury/Illness (Reason for Referral):  See H&P below  65years old M with PMH of ESRD s/p kidney transplant, HTN presented to the ED complaining of nausea, vomiting and diarrhea since last night. Patient stated last night he was having \" bowels cramps\" associated with nausea and vomiting several times food content. Patient described the diarrhea as watery several episodes. Patient stated he is working at [employer] where there are several people with similar symptoms. Patient also reported having associated reflux symptoms. Patient denies sick contact at home or eating outside/ new food intake. Patient also denies SOB, chest pain, cough or urinary symptoms. Past Medical History/Comorbidities:   Mr. Rima Treviño  has a past medical history of Chronic Kidney Disease; GERD (Gastroesophageal Reflux Disease); Hypertension; PUD (Peptic Ulcer Disease); and Thromboembolus ([address] He also has no past medical history of Arrhythmia; Arthritis; Asthma; Autoimmune Disease ([address] CAD (Coronary Artery Disease); Cancer ([address] Chronic Pain; COPD; Diabetes ([address] Heart Failure ([address] Liver Disease; Other Ill-Defined Conditions; Psychiatric Disorder; Seizures ([address] Stroke [facility]); or Thyroid Disease. Mr. Rima Treviño  has a past surgical history that includes hx transplant. Social History/Living Environment:   Home Environment: Private residence  # Steps to Enter: 3  One/Two Story Residence: One story  Living Alone: No  Support Systems: Spouse/Significant Other/Partner  Patient Expects to be Discharged to[de-identified] Private residence  Current DME Used/Available at Home: None  Prior Level of Function/Work/Activity:  Lives with wife, indep with gait and ADLs, 0 falls, drives, works 2 jobs.     Number of Personal Factors/Comorbidities that affect the Plan of Care: 3+: HIGH COMPLEXITY   EXAMINATION:   Most Recent Physical Functioning:   Gross Assessment:  AROM: Within functional limits  Strength: Within functional limits  Coordination: Within functional limits               Posture:     Balance:  Sitting: Intact; Without support  Standing: Intact; Without support Bed Mobility:     Wheelchair Mobility:     Transfers:  Sit to Stand: Independent  Stand to Sit: Independent  Gait:     Speed/Bisi: Slow  Distance (ft): 120 Feet (ft)  Assistive Device:  (none- pushing IV pole)  Ambulation - Level of Assistance: Independent  Interventions: Safety awareness training;Verbal cues      Body Structures Involved:  1. Heart  2. Lungs  3. Muscles Body Functions Affected:  1. Cardio  2. Respiratory  3. Neuromusculoskeletal  4. Movement Related Activities and Participation Affected:  1. General Tasks and Demands  2. Mobility  3. Self Care  4. Domestic Life  5. Interpersonal Interactions and Relationships  6. Community, Social and [place]   Number of elements that affect the Plan of Care: 4+: HIGH COMPLEXITY   CLINICAL PRESENTATION:   Presentation: Evolving clinical presentation with changing clinical characteristics: MODERATE COMPLEXITY   CLINICAL DECISION MAKIN [facility] Inpatient Short Form  How much difficulty does the patient currently have. .. Unable A Lot A Little None   1. Turning over in bed (including adjusting bedclothes, sheets and blankets)? [] 1   [] 2   [] 3   [x] 4   2. Sitting down on and standing up from a chair with arms ( e.g., wheelchair, bedside commode, etc.)   [] 1   [] 2   [] 3   [x] 4   3. Moving from lying on back to sitting on the side of the bed? [] 1   [] 2   [] 3   [x] 4   How much help from another person does the patient currently need. .. Total A Lot A Little None   4. Moving to and from a bed to a chair (including a wheelchair)? [] 1   [] 2   [] 3   [x] 4   5. Need to walk in hospital room?    [] 1   [] 2   [] 3 [x] 4   6. Climbing 3-5 steps with a railing? [] 1   [] 2   [] 3   [x] 4   © 2007, Trustees of [address] [place] [address] [address] [id number], under license to [employer]. All rights reserved      Score:  Initial: 24 Most Recent: X (Date: -- )    Interpretation of Tool:  Represents activities that are increasingly more difficult (i.e. Bed mobility, Transfers, Gait). Score 24 23 22-20 19-15 14-10 9-7 6     Modifier CH CI CJ CK CL CM CN      ?  Mobility - Walking and Moving Around:     - CURRENT STATUS: CH - 0% impaired, limited or restricted    - GOAL STATUS: CH - 0% impaired, limited or restricted    - D/C STATUS:  CH - 0% impaired, limited or restricted  Payor: [employer] [facility] / Plan: [address] [place] [address] / Product Type: PPO /         Use of outcome tool(s) and clinical judgement create a POC that gives a: Clear prediction of patient's progress: LOW COMPLEXITY            TREATMENT:   (In addition to Assessment/Re-Assessment sessions the following treatments were rendered)   Pre-treatment Symptoms/Complaints:  Fatigue  Pain: Initial:   Pain Intensity 1: 0  Post Session:  0/10     Assessment/Reassessment only, no treatment provided today    Braces/Orthotics/Lines/Etc:   · IV  · O2 Device: Room air  Treatment/Session Assessment:    · Response to Treatment:  Amb 120 ft in hallway independently managing IV pole  · Interdisciplinary Collaboration:   o Physical Therapist  o Registered Nurse  · After treatment position/precautions:   o Up in chair  o Bed/Chair-wheels locked  o Bed in low position  o Call light within reach  o RN notified     Total Treatment Duration:  PT Patient Time In/Time Out  Time In: 1510  Time Out: 85 [facility]

## 2018-01-19 LAB
ANION GAP SERPL CALC-SCNC: 13 MMOL/L (ref 7–16)
BUN SERPL-MCNC: 32 MG/DL (ref 6–23)
CALCIUM SERPL-MCNC: 8.3 MG/DL (ref 8.3–10.4)
CHLORIDE SERPL-SCNC: 110 MMOL/L (ref 98–107)
CO2 SERPL-SCNC: 19 MMOL/L (ref 21–32)
CREAT SERPL-MCNC: 1.68 MG/DL (ref 0.8–1.5)
GLUCOSE SERPL-MCNC: 84 MG/DL (ref 65–100)
POTASSIUM SERPL-SCNC: 3.8 MMOL/L (ref 3.5–5.1)
SODIUM SERPL-SCNC: 142 MMOL/L (ref 136–145)

## 2018-01-19 PROCEDURE — 74011250637 HC RX REV CODE- 250/637: Performed by: INTERNAL MEDICINE

## 2018-01-19 PROCEDURE — 74011636637 HC RX REV CODE- 636/637: Performed by: INTERNAL MEDICINE

## 2018-01-19 PROCEDURE — 80048 BASIC METABOLIC PNL TOTAL CA: CPT | Performed by: INTERNAL MEDICINE

## 2018-01-19 PROCEDURE — 36415 COLL VENOUS BLD VENIPUNCTURE: CPT | Performed by: INTERNAL MEDICINE

## 2018-01-19 PROCEDURE — 74011250636 HC RX REV CODE- 250/636: Performed by: INTERNAL MEDICINE

## 2018-01-19 PROCEDURE — 65270000029 HC RM PRIVATE

## 2018-01-19 PROCEDURE — 74011000250 HC RX REV CODE- 250: Performed by: INTERNAL MEDICINE

## 2018-01-19 PROCEDURE — 74011250636 HC RX REV CODE- 250/636: Performed by: HOSPITALIST

## 2018-01-19 RX ADMIN — Medication 10 ML: at 21:10

## 2018-01-19 RX ADMIN — WATER 1 G: 1 INJECTION INTRAMUSCULAR; INTRAVENOUS; SUBCUTANEOUS at 08:15

## 2018-01-19 RX ADMIN — Medication 10 ML: at 04:44

## 2018-01-19 RX ADMIN — HEPARIN SODIUM 5000 UNITS: 5000 INJECTION, SOLUTION INTRAVENOUS; SUBCUTANEOUS at 21:09

## 2018-01-19 RX ADMIN — PANTOPRAZOLE SODIUM 40 MG: 40 TABLET, DELAYED RELEASE ORAL at 04:42

## 2018-01-19 RX ADMIN — SODIUM CHLORIDE 100 ML/HR: 900 INJECTION, SOLUTION INTRAVENOUS at 04:39

## 2018-01-19 RX ADMIN — TACROLIMUS 6 MG: 1 CAPSULE ORAL at 08:14

## 2018-01-19 RX ADMIN — HEPARIN SODIUM 5000 UNITS: 5000 INJECTION, SOLUTION INTRAVENOUS; SUBCUTANEOUS at 04:40

## 2018-01-19 RX ADMIN — MYCOPHENOLATE MOFETIL 500 MG: 500 TABLET ORAL at 17:15

## 2018-01-19 RX ADMIN — TAMSULOSIN HYDROCHLORIDE 0.4 MG: 0.4 CAPSULE ORAL at 08:15

## 2018-01-19 RX ADMIN — PREDNISONE 5 MG: 5 TABLET ORAL at 08:14

## 2018-01-19 RX ADMIN — MYCOPHENOLATE MOFETIL 500 MG: 500 TABLET ORAL at 21:10

## 2018-01-19 RX ADMIN — Medication 5 ML: at 14:00

## 2018-01-19 RX ADMIN — HEPARIN SODIUM 5000 UNITS: 5000 INJECTION, SOLUTION INTRAVENOUS; SUBCUTANEOUS at 12:25

## 2018-01-19 RX ADMIN — MYCOPHENOLATE MOFETIL 500 MG: 500 TABLET ORAL at 08:14

## 2018-01-19 RX ADMIN — TACROLIMUS 6 MG: 1 CAPSULE ORAL at 21:09

## 2018-01-19 NOTE — PROGRESS NOTES
Hospitalist Progress Note    2018  Admit Date: 2018  9:05 AM   NAME: Beto Sultana   :  1960   DOS:              18  MRN:  [id number]   Attending: Mat Hannon MD  PCP:  Sascha Lucero MD  Treatment Team: Attending Provider: Mat Hannon MD; Consulting Provider: Manpreet Mendoza MD; Utilization Review: Ed Muller RN    Full Code     SUBJECTIVE:   As previously documented: 65years old M with PMH of ESRD s/p kidney transplant, HTN presented to the ED complaining of nausea, vomiting and diarrhea. Patient admitted on the  for acute on chronic kidney injury and gastroenteritis. Renal function improving with IVFs and gastroenteritis likely viral resolving. Nephrology eval appreciated       18    Beto Sultana stated diarrhea is improving. Patient also reported sensation in his L tight is improving after he started walking       10+ ROS reviewed and negative except for positive in HPI. No Known Allergies  Current Facility-Administered Medications   Medication Dose Route Frequency    mycophenolate (CELLCEPT) tablet 500 mg  500 mg Oral TID    sodium chloride (NS) flush 5-10 mL  5-10 mL IntraVENous Q8H    sodium chloride (NS) flush 5-10 mL  5-10 mL IntraVENous PRN    acetaminophen (TYLENOL) tablet 650 mg  650 mg Oral Q4H PRN    ondansetron (ZOFRAN) injection 4 mg  4 mg IntraVENous Q4H PRN    heparin (porcine) injection 5,000 Units  5,000 Units SubCUTAneous Q8H    pantoprazole (PROTONIX) tablet 40 mg  40 mg Oral ACB    predniSONE (DELTASONE) tablet 5 mg  5 mg Oral DAILY WITH BREAKFAST    tamsulosin (FLOMAX) capsule 0.4 mg  0.4 mg Oral DAILY    tacrolimus (PROGRAF) capsule 6 mg  6 mg Oral Q12H           There is no immunization history on file for this patient.   Objective:     Patient Vitals for the past 24 hrs:   Temp Pulse Resp BP SpO2   18 1514 97.9 °F (36.6 °C) 62 18 148/79 96 %   18 1123 97.9 °F (36.6 °C) 77 18 145/82 97 % 18 0654 97.8 °F (36.6 °C) 67 18 145/77 96 %   18 0405 98 °F (36.7 °C) 93 20 142/74 98 %   18 2308 97.9 °F (36.6 °C) 71 19 147/78 96 %   18 1938 97.9 °F (36.6 °C) (!) 104 20 146/90 97 %     Temp (24hrs), Av.9 °F (36.6 °C), Min:97.8 °F (36.6 °C), Max:98 °F (36.7 °C)    Oxygen Therapy  O2 Sat (%): 96 % (18 1514)  Pulse via Oximetry: 79 beats per minute (18 1532)  O2 Device: Room air (18 0710)  Oxygen Therapy  O2 Sat (%): 96 % (18 1514)  Pulse via Oximetry: 79 beats per minute (18 1532)  O2 Device: Room air (18 0710)    Physical Exam:  General:         Alert, cooperative, no distress   HEENT:               NCAT. No obvious deformity. Lungs:  CTABL. No wheezing/rhonchi/rales  Cardiovascular:   RRR. No m/r/g. No pedal edema b/l. +2 PT/DT pulses b/l. Abdomen:       S/nt/nd. Bowel sounds normal. .   Skin:         No rashes or lesions. Not Jaundiced  Neurologic:    AAOx3. LLE: sensation slightly decrease on L thigh. Muscle strength and pedal pulse intact. Psychiatric:         Good mood. Normal affect.                DIAGNOSTIC STUDIES      Data Review:   Recent Results (from the past 24 hour(s))   METABOLIC PANEL, BASIC    Collection Time: 18  5:01 AM   Result Value Ref Range    Sodium 142 136 - 145 mmol/L    Potassium 3.8 3.5 - 5.1 mmol/L    Chloride 110 (H) 98 - 107 mmol/L    CO2 19 (L) 21 - 32 mmol/L    Anion gap 13 7 - 16 mmol/L    Glucose 84 65 - 100 mg/dL    BUN 32 (H) 6 - 23 MG/DL    Creatinine 1.68 (H) 0.8 - 1.5 MG/DL    GFR est AA 55 (L) >60 ml/min/1.73m2    GFR est non-AA 45 (L) >60 ml/min/1.73m2    Calcium 8.3 8.3 - 10.4 MG/DL       All Micro Results     Procedure Component Value Units Date/Time    CULTURE, URINE [[id number]] Collected:  18 0831    Order Status:  Completed Specimen:  Urine from [place] Specimen Updated:  18 0737     Special Requests: NO SPECIAL REQUESTS        Culture result: NO GROWTH 1 DAY       C. DIFFICILE/EPI PCR [[id number]] Collected:  01/16/18 2326    Order Status:  Completed Specimen:  Stool Updated:  01/17/18 0342     Special Requests: NO SPECIAL REQUESTS        Culture result: NEGATIVE                  Toxigenic C. diff NEGATIVE/ 027-NAP1-BI PRESUMPTIVE NEGATIVE    INFLUENZA A & B AG (RAPID TEST) [[id number]] Collected:  01/16/18 2327    Order Status:  Completed Specimen:  Nasopharyngeal from Nasal washing Updated:  01/17/18 0032     Influenza A Ag NEGATIVE          NEGATIVE FOR THE PRESENCE OF INFLUENZA A ANTIGEN  INFECTION DUE TO INFLUENZA A CANNOT BE RULED OUT. BECAUSE THE ANTIGEN PRESENT IN THE SAMPLE MAY BE BELOW  THE DETECTION LIMIT OF THE TEST. A NEGATIVE TEST IS PRESUMPTIVE AND IT IS RECOMMENDED THAT THESE RESULTS BE CONFIRMED BY VIRAL CULTURE OR AN FDA-CLEARED INFLUENZA A AND B MOLECULAR ASSAY. Influenza B Ag NEGATIVE          NEGATIVE FOR THE PRESENCE OF INFLUENZA B ANTIGEN  INFECTION DUE TO INFLUENZA B CANNOT BE RULED OUT. BECAUSE THE ANTIGEN PRESENT IN THE SAMPLE MAY BE BELOW  THE DETECTION LIMIT OF THE TEST. A NEGATIVE TEST IS PRESUMPTIVE AND IT IS RECOMMENDED THAT THESE RESULTS BE CONFIRMED BY VIRAL CULTURE OR AN FDA-CLEARED INFLUENZA A AND B MOLECULAR ASSAY. Imaging /Procedures /Studies:    CXR Results  (Last 48 hours)    None        CT Results  (Last 48 hours)    None        No results found. No results found for this visit on 01/16/18.     Labs and Studies from previous 24 hours have been personally reviewed by myself [place] Problems    Diagnosis Date Noted    Acute on chronic kidney failure ([facility] 01/16/2018    HTN (hypertension) 01/16/2018    ESRD (end stage renal disease) ([facility] 01/16/2018     Hospital Problems as of 1/19/2018  Never Reviewed          Codes Class Noted - Resolved POA    * (Principal)Acute on chronic kidney failure ([facility] ICD-10-CM: N17.9, N18.9  ICD-9-CM: 584.9, 585.9  1/16/2018 - Present Unknown        HTN (hypertension) ICD-10-CM: I10  ICD-9-CM: 401.9  1/16/2018 - Present Unknown        ESRD (end stage renal disease) ([facility] ICD-10-CM: N18.6  ICD-9-CM: 585.6  1/16/2018 - Present Unknown              A/P:  -Acute on chronic kidney injury  Cr trending down  Nephrology eval appreciated  D/c fluid if patient is toelrating PO diet  Cont immunosuppressive therapy for kidney transplant  Likely discharge tomorrow     -Gastroenteritis  Improving, Likely viral  S/p CT abdomen  Influenza and c.difficile neg  Hydration as above    -HTN  Controlled  Hold home meds to prevent hypotension    -Paresthesia  Patient reported is improving  Could be from pressure on a nerve while sleeping  Will monitor    -Suspected UTI  UA having 20-50 wbc  Ucx negative  Dc  Ceftriaxone unlikely UTI        DVT Prophylaxis: heparin  CODE Status: full  Plan of Care Discussed with: patient.  Care team.    Maggy Grewal MD  01/19/18

## 2018-01-19 NOTE — PROGRESS NOTES
Pt tolerated diet well for breakfast and lunch. Having no trouble with liquids. IV fluids HW per nephrology recommendation.

## 2018-01-19 NOTE — PROGRESS NOTES
[facility] progress note    Follow-Up on: 1/19/2018     Patient seen and examined. Feeling better. He is still having some diarrhea but no n/v. He ate breakfast today. ROS:  Gen - no fever, no chills, appetite improving  CV - no chest pain, no orthopnea  Lung - no shortness of breath, no cough  Abd - no tenderness, no further nausea, no further vomiting, +diarrhea  Ext - no edema    Exam:  Vitals:    01/19/18 0405 01/19/18 0438 01/19/18 0654 01/19/18 1123   BP: 142/74  145/77 145/82   Pulse: 93  67 77   Resp: 20 18 18   Temp: 98 °F (36.7 °C)  97.8 °F (36.6 °C) 97.9 °F (36.6 °C)   SpO2: 98%  96% 97%   Weight:  124 kg (273 lb 6.4 oz)           Intake/Output Summary (Last 24 hours) at 01/19/18 1136  Last data filed at 01/19/18 0826   Gross per 24 hour   Intake              960 ml   Output                0 ml   Net              960 ml       GEN - in no distress  CV - S1, S2, no rub  Lung - clear bilaterally  Abd - soft, nontender  Ext - no edema    No results for input(s): WBC, HGB, HCT, PLT, HGBEXT, HCTEXT, PLTEXT, HGBEXT, HCTEXT, PLTEXT in the last 72 hours. Recent Labs      01/19/18   0501  01/18/18   0504  01/17/18   0614   NA  142  138  137   K  3.8  3.8  3.8   CL  110*  107  105   CO2  19*  20*  20*   BUN  32*  40*  45*   CREA  1.68*  2.32*  3.02*   CA  8.3  7.5*  7.9*   GLU  84  112*  113*       Assessment / Plan:    1. KARLA - pre-renal.  Allograft function stabilizing. Now near baseline. Ok to stop fluids when tolerating po well    2. S/p ddKTx (2005) with underlying CAN with baseline creatinine 1.4 - 1.7 mg/dL. Underlying Stage III CKD. He is tolerating ISP therapy.     3.  HTN - stable off Rx.    4.  GERD - on PPI

## 2018-01-20 VITALS
OXYGEN SATURATION: 97 % | HEART RATE: 60 BPM | DIASTOLIC BLOOD PRESSURE: 87 MMHG | BODY MASS INDEX: 33.86 KG/M2 | SYSTOLIC BLOOD PRESSURE: 171 MMHG | WEIGHT: 270.9 LBS | TEMPERATURE: 97.3 F | RESPIRATION RATE: 18 BRPM

## 2018-01-20 LAB
ANION GAP SERPL CALC-SCNC: 7 MMOL/L (ref 7–16)
BACTERIA SPEC CULT: NORMAL
BUN SERPL-MCNC: 24 MG/DL (ref 6–23)
CALCIUM SERPL-MCNC: 8.7 MG/DL (ref 8.3–10.4)
CHLORIDE SERPL-SCNC: 109 MMOL/L (ref 98–107)
CO2 SERPL-SCNC: 26 MMOL/L (ref 21–32)
CREAT SERPL-MCNC: 1.52 MG/DL (ref 0.8–1.5)
GLUCOSE SERPL-MCNC: 83 MG/DL (ref 65–100)
POTASSIUM SERPL-SCNC: 4.2 MMOL/L (ref 3.5–5.1)
SERVICE CMNT-IMP: NORMAL
SODIUM SERPL-SCNC: 142 MMOL/L (ref 136–145)

## 2018-01-20 PROCEDURE — 80048 BASIC METABOLIC PNL TOTAL CA: CPT | Performed by: INTERNAL MEDICINE

## 2018-01-20 PROCEDURE — 36415 COLL VENOUS BLD VENIPUNCTURE: CPT | Performed by: INTERNAL MEDICINE

## 2018-01-20 PROCEDURE — 74011636637 HC RX REV CODE- 636/637: Performed by: INTERNAL MEDICINE

## 2018-01-20 PROCEDURE — 74011250637 HC RX REV CODE- 250/637: Performed by: INTERNAL MEDICINE

## 2018-01-20 PROCEDURE — 74011250636 HC RX REV CODE- 250/636: Performed by: HOSPITALIST

## 2018-01-20 PROCEDURE — 74011250636 HC RX REV CODE- 250/636: Performed by: INTERNAL MEDICINE

## 2018-01-20 RX ORDER — AMLODIPINE BESYLATE 5 MG/1
5 TABLET ORAL DAILY
Status: DISCONTINUED | OUTPATIENT
Start: 2018-01-20 | End: 2018-01-20 | Stop reason: HOSPADM

## 2018-01-20 RX ORDER — AMLODIPINE BESYLATE 5 MG/1
5 TABLET ORAL DAILY
Qty: 30 TAB | Refills: 1 | Status: SHIPPED | OUTPATIENT
Start: 2018-01-20

## 2018-01-20 RX ORDER — AMLODIPINE BESYLATE 5 MG/1
5 TABLET ORAL DAILY
Qty: 30 TAB | Refills: 1 | Status: SHIPPED | OUTPATIENT
Start: 2018-01-20 | End: 2018-01-20

## 2018-01-20 RX ORDER — AMLODIPINE BESYLATE 5 MG/1
5 TABLET ORAL DAILY
Qty: 30 TAB | Refills: 0 | Status: SHIPPED | OUTPATIENT
Start: 2018-01-20 | End: 2018-01-20

## 2018-01-20 RX ADMIN — HEPARIN SODIUM 5000 UNITS: 5000 INJECTION, SOLUTION INTRAVENOUS; SUBCUTANEOUS at 04:45

## 2018-01-20 RX ADMIN — PANTOPRAZOLE SODIUM 40 MG: 40 TABLET, DELAYED RELEASE ORAL at 04:46

## 2018-01-20 RX ADMIN — Medication 10 ML: at 04:46

## 2018-01-20 RX ADMIN — TAMSULOSIN HYDROCHLORIDE 0.4 MG: 0.4 CAPSULE ORAL at 08:39

## 2018-01-20 RX ADMIN — MYCOPHENOLATE MOFETIL 500 MG: 500 TABLET ORAL at 08:39

## 2018-01-20 RX ADMIN — TACROLIMUS 6 MG: 1 CAPSULE ORAL at 08:39

## 2018-01-20 RX ADMIN — PREDNISONE 5 MG: 5 TABLET ORAL at 08:39

## 2018-01-20 NOTE — DISCHARGE INSTRUCTIONS
DISCHARGE SUMMARY from Nurse    PATIENT INSTRUCTIONS:    After general anesthesia or intravenous sedation, for 24 hours or while taking prescription Narcotics:  · Limit your activities  · Do not drive and operate hazardous machinery  · Do not make important personal or business decisions  · Do  not drink alcoholic beverages  · If you have not urinated within 8 hours after discharge, please contact your surgeon on call. Report the following to your surgeon:  · Excessive pain, swelling, redness or odor of or around the surgical area  · Temperature over 100.5  · Nausea and vomiting lasting longer than 4 hours or if unable to take medications  · Any signs of decreased circulation or nerve impairment to extremity: change in color, persistent  numbness, tingling, coldness or increase pain  · Any questions    What to do at Home:  Recommended activity: Activity as tolerated, Call doctor for new or worsening nausea, vomiting, or diarrhea. Call PCP for fever greater than 100.5. *  Please give a list of your current medications to your Primary Care Provider. *  Please update this list whenever your medications are discontinued, doses are      changed, or new medications (including over-the-counter products) are added. *  Please carry medication information at all times in case of emergency situations. These are general instructions for a healthy lifestyle:    No smoking/ No tobacco products/ Avoid exposure to second hand smoke  Surgeon General's Warning:  Quitting smoking now greatly reduces serious risk to your health.     Obesity, smoking, and sedentary lifestyle greatly increases your risk for illness    A healthy diet, regular physical exercise & weight monitoring are important for maintaining a healthy lifestyle    You may be retaining fluid if you have a history of heart failure or if you experience any of the following symptoms:  Weight gain of 3 pounds or more overnight or 5 pounds in a week, increased swelling in our hands or feet or shortness of breath while lying flat in bed. Please call your doctor as soon as you notice any of these symptoms; do not wait until your next office visit. Recognize signs and symptoms of STROKE:    F-face looks uneven    A-arms unable to move or move unevenly    S-speech slurred or non-existent    T-time-call 911 as soon as signs and symptoms begin-DO NOT go       Back to bed or wait to see if you get better-TIME IS BRAIN. Warning Signs of HEART ATTACK     Call 911 if you have these symptoms:   Chest discomfort. Most heart attacks involve discomfort in the center of the chest that lasts more than a few minutes, or that goes away and comes back. It can feel like uncomfortable pressure, squeezing, fullness, or pain.  Discomfort in other areas of the upper body. Symptoms can include pain or discomfort in one or both arms, the back, neck, jaw, or stomach.  Shortness of breath with or without chest discomfort.  Other signs may include breaking out in a cold sweat, nausea, or lightheadedness. Don't wait more than five minutes to call 911 - MINUTES MATTER! Fast action can save your life. Calling 911 is almost always the fastest way to get lifesaving treatment. Emergency Medical Services staff can begin treatment when they arrive -- up to an hour sooner than if someone gets to the hospital by car. The discharge information has been reviewed with the patient. The patient verbalized understanding. Discharge medications reviewed with the patient and appropriate educational materials and side effects teaching were provided.   ___________________________________________________________________________________________________________________________________

## 2018-01-20 NOTE — DISCHARGE SUMMARY
Hospitalist Discharge Summary     Admit Date:  2018  9:05 AM   Name:  Domingo Brenner   Age:  65 y.o.  :  1960   MRN:  [id number]   PCP:  Aníbal Thomas MD  Treatment Team: Attending Provider: Marin Graham MD; Consulting Provider: Linn Elise MD; Utilization Review: Jose Willams RN    Problem List for this Hospitalization:  Hospital Problems as of 2018  Never Reviewed          Codes Class Noted - Resolved POA    * (Principal)Acute on chronic kidney failure ([facility] ICD-10-CM: N17.9, N18.9  ICD-9-CM: 584.9, 585.9  2018 - Present Unknown        HTN (hypertension) ICD-10-CM: I10  ICD-9-CM: 401.9  2018 - Present Unknown        ESRD (end stage renal disease) ([facility] ICD-10-CM: N18.6  ICD-9-CM: 585.6  2018 - Present Unknown                Admission HPI from 2018:    \" 65years old M with PMH of ESRD s/p kidney transplant, HTN presented to the ED complaining of nausea, vomiting and diarrhea since last night. Patient stated last night he was having \" bowels cramps\" associated with nausea and vomiting several times food content. Patient described the diarrhea as watery several episodes. Patient stated he is working at a [employer] where there are several people with similar symptoms. Patient also reported having associated reflux symptoms. Patient denies sick contact at home or eating outside/ new food intake. Patient also denies SOB, chest pain, cough or urinary symptoms. \"    Hospital Course: In the ED work up revealed acute on chronic kidney injury with a Cr of 3.55 compare to baseline of 1.4. Patient had CT abdomen with no acute disease reported. Patient was started on IVFs and transferred to the floor for further work up. Patient was evaluated by nephrology who recommended IVFs and to restart medications for kidney transplant. Patient clinically improved with resolution of nausea/vomiting and was having soft stools upon discharge.  Patient kidney injury resolved, Cr was back around to his baseline. Patient had episode of paraesthesia on L tight resolved with ambulation, likely from sleeping on that side Patient was advised to have proper hydration at home and to follow up with PCP/kidney physician upon discharge. Patient symptoms likely secondary to viral gastroenteritis. Acei will be held due to KARLA. Patient will be started on amlodipine for HTN. Follow up instructions below. Plan was discussed with patient/careteam.  All questions answered. Patient was stable at time of discharge and was instructed to call or return if there are any concerns or recurrence of symptoms. Diagnostic Imaging/Tests: All Micro Results     Procedure Component Value Units Date/Time    CULTURE, URINE [[id number]] Collected:  01/18/18 0831    Order Status:  Completed Specimen:  Urine from [place] Specimen Updated:  01/20/18 0728     Special Requests: NO SPECIAL REQUESTS        Culture result: NO GROWTH 2 DAYS       C. DIFFICILE/EPI PCR [[id number]] Collected:  01/16/18 2326    Order Status:  Completed Specimen:  Stool Updated:  01/17/18 0342     Special Requests: NO SPECIAL REQUESTS        Culture result: NEGATIVE                  Toxigenic C. diff NEGATIVE/ [id number]-NAP1-[id number] PRESUMPTIVE NEGATIVE    INFLUENZA A & B AG (RAPID TEST) [[id number]] Collected:  01/16/18 2327    Order Status:  Completed Specimen:  Nasopharyngeal from Nasal washing Updated:  01/17/18 0032     Influenza A Ag NEGATIVE          NEGATIVE FOR THE PRESENCE OF INFLUENZA A ANTIGEN  INFECTION DUE TO INFLUENZA A CANNOT BE RULED OUT. BECAUSE THE ANTIGEN PRESENT IN THE SAMPLE MAY BE BELOW  THE DETECTION LIMIT OF THE TEST. A NEGATIVE TEST IS PRESUMPTIVE AND IT IS RECOMMENDED THAT THESE RESULTS BE CONFIRMED BY VIRAL CULTURE OR AN FDA-CLEARED INFLUENZA A AND B MOLECULAR ASSAY.           Influenza B Ag NEGATIVE          NEGATIVE FOR THE PRESENCE OF INFLUENZA B ANTIGEN  INFECTION DUE TO INFLUENZA B CANNOT BE RULED OUT.  BECAUSE THE ANTIGEN PRESENT IN THE SAMPLE MAY BE BELOW  THE DETECTION LIMIT OF THE TEST. A NEGATIVE TEST IS PRESUMPTIVE AND IT IS RECOMMENDED THAT THESE RESULTS BE CONFIRMED BY VIRAL CULTURE OR AN FDA-CLEARED INFLUENZA A AND B MOLECULAR ASSAY. Labs: Results:       BMP, Mg, Phos Recent Labs      01/20/18   0516  01/19/18   0501  01/18/18   0504   NA  142  142  138   K  4.2  3.8  3.8   CL  109*  110*  107   CO2  26  19*  20*   AGAP  7  13  11   BUN  24*  32*  40*   CREA  1.52*  1.68*  2.32*   CA  8.7  8.3  7.5*   GLU  83  84  112*      CBC No results for input(s): WBC, RBC, HGB, HCT, PLT, GRANS, LYMPH, EOS, MONOS, BASOS, IG, ANEU, ABL, J LUIS, ABM, ABB, AIG, HGBEXT, HCTEXT, PLTEXT, HGBEXT, HCTEXT, PLTEXT in the last 72 hours. LFT No results for input(s): SGOT, ALT, TBIL, AP, TP, ALB, GLOB, AGRAT, GPT in the last 72 hours. Cardiac Testing No results found for: BNPP, BNP, CPK, RCK1, RCK2, RCK3, RCK4, CKMB, CKNDX, CKND1, TROPT, TROIQ   Coagulation Tests No results found for: PTP, INR, APTT   A1c No results found for: HBA1C, HGBE8, OKY5OEIU, HYH0NTIV   Lipid Panel No results found for: CHOL, CHOLPOCT, CHOLX, CHLST, CHOLV, 058789, HDL, LDL, LDLC, DLDLP, 943469, VLDLC, VLDL, TGLX, TRIGL, TRIGP, TGLPOCT, CHHD, CHHDX   Thyroid Panel No results found for: T4, T3U, TSH, TSHEXT, TSHEXT     Most Recent UA Lab Results   Component Value Date/Time    Color XANDER 01/17/2018 04:05 AM    Appearance CLOUDY 01/17/2018 04:05 AM    Specific gravity 1.020 01/17/2018 04:05 AM    pH (UA) 5.5 01/17/2018 04:05 AM    Protein 30 01/17/2018 04:05 AM    Glucose NEGATIVE  01/17/2018 04:05 AM    Ketone NEGATIVE  01/17/2018 04:05 AM    Bilirubin SMALL 01/17/2018 04:05 AM    Blood MODERATE 01/17/2018 04:05 AM    Urobilinogen 0.2 01/17/2018 04:05 AM    Nitrites NEGATIVE  01/17/2018 04:05 AM    Leukocyte Esterase SMALL 01/17/2018 04:05 AM        No Known Allergies    There is no immunization history on file for this patient.     All Labs from Last 24 Hrs:  Recent Results (from the past 24 hour(s))   METABOLIC PANEL, BASIC    Collection Time: 01/20/18  5:16 AM   Result Value Ref Range    Sodium 142 136 - 145 mmol/L    Potassium 4.2 3.5 - 5.1 mmol/L    Chloride 109 (H) 98 - 107 mmol/L    CO2 26 21 - 32 mmol/L    Anion gap 7 7 - 16 mmol/L    Glucose 83 65 - 100 mg/dL    BUN 24 (H) 6 - 23 MG/DL    Creatinine 1.52 (H) 0.8 - 1.5 MG/DL    GFR est AA >60 >60 ml/min/1.73m2    GFR est non-AA 51 (L) >60 ml/min/1.73m2    Calcium 8.7 8.3 - 10.4 MG/DL       Discharge Exam:  Patient Vitals for the past 24 hrs:   Temp Pulse Resp BP SpO2   01/20/18 0712 97.3 °F (36.3 °C) 60 18 171/87 97 %   01/20/18 0400 97.4 °F (36.3 °C) 63 20 (!) 153/91 92 %   01/20/18 0121 98 °F (36.7 °C) (!) 57 20 153/87 94 %   01/19/18 2015 97.8 °F (36.6 °C) 62 20 (!) 157/94 93 %   01/19/18 1514 97.9 °F (36.6 °C) 62 18 148/79 96 %   01/19/18 1123 97.9 °F (36.6 °C) 77 18 145/82 97 %     Oxygen Therapy  O2 Sat (%): 97 % (01/20/18 0712)  Pulse via Oximetry: 79 beats per minute (01/16/18 1532)  O2 Device: Room air (01/16/18 0710)    Intake/Output Summary (Last 24 hours) at 01/20/18 1014  Last data filed at 01/20/18 0133   Gross per 24 hour   Intake              720 ml   Output                0 ml   Net              720 ml       General:              Alert, cooperative, no distress   HEENT:               NCAT. No obvious deformity. Lungs:  CTABL. No wheezing/rhonchi/rales  Cardiovascular:   RRR. No m/r/g. No pedal edema b/l. +2 PT/DT pulses b/l. Abdomen:            S/nt/nd. Bowel sounds normal. .   Skin:                     No rashes or lesions. Not Jaundiced  Neurologic:           AAOx3. No gross focal deficits  Psychiatric:         Good mood. Normal affect. Discharge Info:   Current Discharge Medication List      START taking these medications    Details   amLODIPine (NORVASC) 5 mg tablet Take 1 Tab by mouth daily.   Qty: 30 Tab, Refills: 1         CONTINUE these medications which have NOT CHANGED    Details   !! tacrolimus (PROGRAF) 5 mg capsule Take 5 mg by mouth daily. Total 6 mg prograf      traMADol (ULTRAM) 50 mg tablet Take 50 mg by mouth two (2) times a day. !! tacrolimus (PROGRAF) 1 mg capsule Take 1 mg by mouth daily. rosuvastatin (CRESTOR) 10 mg tablet Take 10 mg by mouth nightly. AMMONIUM LACTATE (LACLOTION EX) by Apply Externally route as needed (lotion). gabapentin enacarbil (HORIZANT) 600 mg TbER Take 300 mg by mouth daily. cholecalciferol, vitamin D3, (VITAMIN D3) 2,000 unit tab Take 2,000 Units by mouth daily. gabapentin (NEURONTIN) 600 mg tablet Take 600 mg by mouth daily. ferrous sulfate 324 mg (65 mg iron) tablet Take  by mouth Daily (before breakfast). predniSONE (DELTASONE) 5 mg tablet Take 1 Tab by mouth daily (with breakfast). sodium & potassium bicarbonate 360-180 mg Tab Take 2 Tabs by mouth two (2) times a day. tamsulosin (FLOMAX) 0.4 mg capsule Take 0.4 mg by mouth daily. ALENDRONATE SODIUM (FOSAMAX PO) Take 1 Tab by mouth daily. hydrocodone-acetaminophen (LORTAB) 7.5-500 mg per tablet Take 1 Tab by mouth every four (4) hours as needed for Pain.      mycophenolate (CELLCEPT) 500 mg tablet Take 1,500 mg by mouth two (2) times a day. esomeprazole (NEXIUM) 40 mg capsule Take 1 Cap by mouth daily. calcium & magnesium carbonates 311-232 mg per tablet Take 1 Tab by mouth daily. !! - Potential duplicate medications found. Please discuss with provider.       STOP taking these medications       lisinopril (PRINIVIL, ZESTRIL) 10 mg tablet Comments:   Reason for Stopping:                 Disposition: home  Activity: Activity as tolerated  Diet: renal diet    Follow-up Information     Follow up With Details Comments Wilmer Melendez MD   [address] [facility] [address] [facility] [address]      Ed Gross MD In 5 days  [address] [address]  [phone]                  Signed:   Kevin Bray MD

## 2018-01-20 NOTE — PROGRESS NOTES
[address] January 20, 2018       RE: [id number] [facility] [place]      To Whom It May Concern,    This is to certify that [id number] [facility] [address] was under our care beginning January 16, 2018 and may return to work January 25, 2018. Please feel free to contact us if you have any questions or concerns. Thank you for your assistance in this matter.   [phone]      Sincerely,  Nilam Gibbons RN

## 2019-01-21 ENCOUNTER — HOSPITAL ENCOUNTER (OUTPATIENT)
Dept: CT IMAGING | Age: 59
Discharge: HOME | End: 2019-01-21
Attending: INTERNAL MEDICINE
Payer: COMMERCIAL

## 2019-01-21 VITALS — BODY MASS INDEX: 32.33 KG/M2 | WEIGHT: 260 LBS | HEIGHT: 75 IN

## 2019-01-21 DIAGNOSIS — R39.89 BLADDER PAIN: ICD-10-CM

## 2019-01-21 DIAGNOSIS — R33.9 RETENTION OF URINE, UNSPECIFIED: ICD-10-CM

## 2019-01-21 LAB — CREAT BLD-MCNC: 1.8 MG/DL (ref 0.8–1.5)

## 2019-01-21 PROCEDURE — 74011000258 HC RX REV CODE- 258: Performed by: INTERNAL MEDICINE

## 2019-01-21 PROCEDURE — 82565 ASSAY OF CREATININE: CPT

## 2019-01-21 PROCEDURE — 74011636320 HC RX REV CODE- 636/320: Performed by: INTERNAL MEDICINE

## 2019-01-21 PROCEDURE — 74177 CT ABD & PELVIS W/CONTRAST: CPT

## 2019-01-21 RX ORDER — SODIUM CHLORIDE 0.9 % (FLUSH) 0.9 %
10 SYRINGE (ML) INJECTION
Status: COMPLETED | OUTPATIENT
Start: 2019-01-21 | End: 2019-01-21

## 2019-01-21 RX ADMIN — IOPAMIDOL 100 ML: 755 INJECTION, SOLUTION INTRAVENOUS at 10:31

## 2019-01-21 RX ADMIN — Medication 10 ML: at 10:31

## 2019-01-21 RX ADMIN — DIATRIZOATE MEGLUMINE AND DIATRIZOATE SODIUM 15 ML: 660; 100 LIQUID ORAL; RECTAL at 10:31

## 2019-01-21 RX ADMIN — SODIUM CHLORIDE 100 ML: 900 INJECTION, SOLUTION INTRAVENOUS at 10:31

## 2020-05-29 ENCOUNTER — HOSPITAL ENCOUNTER (OUTPATIENT)
Dept: GENERAL RADIOLOGY | Age: 60
Discharge: HOME | End: 2020-05-29